# Patient Record
Sex: MALE | Race: WHITE | NOT HISPANIC OR LATINO | Employment: PART TIME | URBAN - METROPOLITAN AREA
[De-identification: names, ages, dates, MRNs, and addresses within clinical notes are randomized per-mention and may not be internally consistent; named-entity substitution may affect disease eponyms.]

---

## 2019-03-28 ENCOUNTER — APPOINTMENT (OUTPATIENT)
Dept: RADIOLOGY | Facility: IMAGING CENTER | Age: 44
End: 2019-03-28
Attending: PHYSICIAN ASSISTANT
Payer: COMMERCIAL

## 2019-03-28 ENCOUNTER — OFFICE VISIT (OUTPATIENT)
Dept: URGENT CARE | Facility: PHYSICIAN GROUP | Age: 44
End: 2019-03-28
Payer: COMMERCIAL

## 2019-03-28 VITALS
SYSTOLIC BLOOD PRESSURE: 124 MMHG | OXYGEN SATURATION: 97 % | HEIGHT: 78 IN | BODY MASS INDEX: 36.45 KG/M2 | HEART RATE: 74 BPM | WEIGHT: 315 LBS | RESPIRATION RATE: 18 BRPM | TEMPERATURE: 97.6 F | DIASTOLIC BLOOD PRESSURE: 82 MMHG

## 2019-03-28 DIAGNOSIS — S93.401A MODERATE RIGHT ANKLE SPRAIN, INITIAL ENCOUNTER: ICD-10-CM

## 2019-03-28 DIAGNOSIS — S93.601A RIGHT FOOT SPRAIN, INITIAL ENCOUNTER: ICD-10-CM

## 2019-03-28 DIAGNOSIS — S93.401A MODERATE RIGHT ANKLE SPRAIN, INITIAL ENCOUNTER: Primary | ICD-10-CM

## 2019-03-28 PROCEDURE — 73610 X-RAY EXAM OF ANKLE: CPT | Mod: 26,RT | Performed by: PHYSICIAN ASSISTANT

## 2019-03-28 PROCEDURE — 73630 X-RAY EXAM OF FOOT: CPT | Mod: TC,RT | Performed by: PHYSICIAN ASSISTANT

## 2019-03-28 PROCEDURE — 99213 OFFICE O/P EST LOW 20 MIN: CPT | Performed by: PHYSICIAN ASSISTANT

## 2019-03-28 RX ORDER — TRAMADOL HYDROCHLORIDE 50 MG/1
50-100 TABLET ORAL EVERY 4 HOURS PRN
Qty: 30 TAB | Refills: 0 | Status: SHIPPED | OUTPATIENT
Start: 2019-03-28 | End: 2019-03-28

## 2019-03-28 NOTE — LETTER
March 28, 2019       Patient: Jeffrey Cerda   YOB: 1975   Date of Visit: 3/28/2019         To Whom It May Concern:    It is my medical opinion that Jeffrey Cerda may be excused from work for the dates of 3/28/19-3/31/19.      If you have any questions or concerns, please don't hesitate to call [phone]          Sincerely,          Kali Alfonso P.A.-C.  Electronically Signed

## 2019-03-28 NOTE — LETTER
March 28, 2019       Patient: Jeffrey Cerda   YOB: 1975   Date of Visit: 3/28/2019         To Whom It May Concern:    It is my medical opinion that Jeffrey Cerda may be excused from work for the dates of 3/28/19-4/7/19.      If you have any questions or concerns, please don't hesitate to call [phone]          Sincerely,          Kali Alfonso P.A.-C.  Electronically Signed

## 2019-03-31 NOTE — PROGRESS NOTES
Subjective:      Pt is a 43 y.o. male who presents with Ankle Pain (R ankle pain, pt steped on a rock and twisted his ankle, sharp/shooting pain, x3 days)            HPI  This is a new problem. Pt notes 3 days ago stepped on a rock and rolled his right foot and ankle and now notes swelling, sharp and stabbing pain when he weight bears and is here as he is concerned for fracture. .Pt has not taken any Rx medications for this condition. Pt states the pain is a 7/10, aching in nature and worse during the day. Pt denies CP, SOB, NVD, paresthesias, headaches, dizziness, change in vision, hives, or other joint pain. The pt's medication list, problem list, and allergies have been evaluated and reviewed during today's visit.      PMH:  Negative per pt.      PSH:  Negative per pt.      Fam Hx:  the patient's family history is not pertinent to their current complaint    Soc HX:  Social History     Social History   • Marital status:      Spouse name: N/A   • Number of children: N/A   • Years of education: N/A     Occupational History   • Not on file.     Social History Main Topics   • Smoking status: Current Every Day Smoker     Packs/day: 0.50     Types: Cigarettes   • Smokeless tobacco: Never Used   • Alcohol use Yes      Comment: Occ   • Drug use: Yes     Types: Marijuana   • Sexual activity: Not on file     Other Topics Concern   • Not on file     Social History Narrative   • No narrative on file         Medications:    Current Outpatient Prescriptions:   •  lovastatin (MEVACOR) 20 MG Tab, Take 20 mg by mouth every evening., Disp: , Rfl:   •  gabapentin (NEURONTIN) 300 MG CAPS, Take 300 mg by mouth 3 times a day., Disp: , Rfl:   •  phentermine 37.5 MG capsule, Take 37.5 mg by mouth every morning., Disp: , Rfl:       Allergies:  Corticosteroids    ROS  Constitutional: Negative for fever, chills and malaise/fatigue.   HENT: Negative for congestion and sore throat.    Eyes: Negative for blurred vision, double vision  "and photophobia.   Respiratory: Negative for cough and shortness of breath.  Cardiovascular: Negative for chest pain and palpitations.   Gastrointestinal: Negative for heartburn, nausea, vomiting, abdominal pain, diarrhea and constipation.   Genitourinary: Negative for dysuria and flank pain.   Musculoskeletal: POS for right foot and ankle joint pain and myalgias.   Skin: Negative for itching and rash.   Neurological: Negative for dizziness, tingling and headaches.   Endo/Heme/Allergies: Does not bruise/bleed easily.   Psychiatric/Behavioral: Negative for depression. The patient is not nervous/anxious.           Objective:     /82 (BP Location: Left arm, Patient Position: Sitting, BP Cuff Size: Large adult)   Pulse 74   Temp 36.4 °C (97.6 °F) (Temporal)   Resp 18   Ht 1.981 m (6' 6\")   Wt (!) 162.4 kg (358 lb)   SpO2 97%   BMI 41.37 kg/m²      Physical Exam   Musculoskeletal:        Right ankle: He exhibits decreased range of motion and swelling. He exhibits no ecchymosis, no deformity, no laceration and normal pulse. Tenderness. Lateral malleolus and AITFL tenderness found. No medial malleolus, no CF ligament, no posterior TFL, no head of 5th metatarsal and no proximal fibula tenderness found. Achilles tendon normal.        Right foot: There is decreased range of motion, tenderness and swelling. There is no bony tenderness, normal capillary refill, no crepitus, no deformity and no laceration.        Feet:      Constitutional: PT is oriented to person, place, and time. PT appears well-developed and well-nourished. No distress.   HENT:   Head: Normocephalic and atraumatic.   Mouth/Throat: Oropharynx is clear and moist. No oropharyngeal exudate.   Eyes: Conjunctivae normal and EOM are normal. Pupils are equal, round, and reactive to light.   Neck: Normal range of motion. Neck supple. No thyromegaly present.   Cardiovascular: Normal rate, regular rhythm, normal heart sounds and intact distal pulses.  Exam " reveals no gallop and no friction rub.    No murmur heard.  Pulmonary/Chest: Effort normal and breath sounds normal. No respiratory distress. PT has no wheezes. PT has no rales. Pt exhibits no tenderness.   Abdominal: Soft. Bowel sounds are normal. PT exhibits no distension and no mass. There is no tenderness. There is no rebound and no guarding.   Neurological: PT is alert and oriented to person, place, and time. PT has normal reflexes. No cranial nerve deficit.   Skin: Skin is warm and dry. No rash noted. PT is not diaphoretic. No erythema.       Psychiatric: PT has a normal mood and affect. PT behavior is normal. Judgment and thought content normal.      RADS:  Narrative       3/28/2019 10:41 AM    HISTORY/REASON FOR EXAM:  Pain/Deformity Following Trauma  Right foot and ankle pain    TECHNIQUE/EXAM DESCRIPTION AND NUMBER OF VIEWS:  3 nonweightbearing views of the RIGHT foot.    COMPARISON:  None    FINDINGS:  No acute fracture or malalignment. There is soft tissue edema around the ankle joint in the dorsum of the foot.   Impression       No acute fracture is identified.   Reading Provider Reading Date   Nathalia Smith M.D. Mar 28, 2019   Signing Provider Signing Date Signing Time   Nathalia Smith M.D. Mar 28, 2019 11:20 AM       Narrative       3/28/2019 10:41 AM    HISTORY/REASON FOR EXAM:  Pain/Deformity Following Trauma  Right foot and ankle pain    TECHNIQUE/EXAM DESCRIPTION AND NUMBER OF VIEWS:  3 views of the RIGHT ankle.    COMPARISON: None    FINDINGS:    No acute fracture or malalignment.  There is diffuse soft tissue swelling around the ankle joint. No osteochondral lesion is identified.   Impression         No acute fracture identified.   Reading Provider Reading Date   Nathalia Smith M.D. Mar 28, 2019   Signing Provider Signing Date Signing Time   Nathalia Smith M.D. Mar 28, 2019 11:20 AM          Assessment/Plan:     1. Moderate right ankle sprain, initial encounter    - DX-ANKLE 3+  VIEWS RIGHT; Future    2. Right foot sprain, initial encounter    - DX-FOOT-COMPLETE 3+ RIGHT; Future    RICE therapy discussed  Gentle ROM exercises discussed  WBAT RLE  Ace wrap and walker boot to RLE  Pt has own crutches  Ice/heat therapy discussed  OTC ibuprofen for pain control  Rest, fluids encouraged.  AVS with medical info given.  Pt was in full understanding and agreement with the plan.  Follow-up as needed if symptoms worsen or fail to improve.

## 2020-06-11 ENCOUNTER — HOSPITAL ENCOUNTER (EMERGENCY)
Facility: MEDICAL CENTER | Age: 45
End: 2020-06-11
Attending: EMERGENCY MEDICINE
Payer: COMMERCIAL

## 2020-06-11 VITALS
RESPIRATION RATE: 16 BRPM | DIASTOLIC BLOOD PRESSURE: 75 MMHG | WEIGHT: 315 LBS | HEIGHT: 78 IN | BODY MASS INDEX: 36.45 KG/M2 | OXYGEN SATURATION: 97 % | TEMPERATURE: 97.7 F | HEART RATE: 70 BPM | SYSTOLIC BLOOD PRESSURE: 141 MMHG

## 2020-06-11 DIAGNOSIS — M25.562 ACUTE PAIN OF LEFT KNEE: ICD-10-CM

## 2020-06-11 PROCEDURE — 99282 EMERGENCY DEPT VISIT SF MDM: CPT

## 2020-06-11 RX ORDER — BACLOFEN 20 MG/1
20 TABLET ORAL 3 TIMES DAILY
COMMUNITY

## 2020-06-11 RX ORDER — OXYCODONE AND ACETAMINOPHEN 10; 325 MG/1; MG/1
1 TABLET ORAL EVERY 4 HOURS PRN
Status: SHIPPED | COMMUNITY
End: 2023-09-22

## 2020-06-11 NOTE — LETTER
[facility], EMERGENCY DEPT   [address], [place] [address]  Phone: Dept: [phone] - Fax:        [facility] Progress Report and Disability Certification  Date of Service: 6/11/2020   No Show:  No  Date / Time of Next Visit: 6/12/2020   Claim Information   Patient Name: Jeffrey Cerda  Claim Number:     Employer:  [employer] Date of Injury: 5/19/2020     Insurer / TPA: [employer] ID / SSN:    Occupation: [employer] 1 Diagnosis: The encounter diagnosis was Acute pain of left knee.    Medical Information   Related to Industrial Injury? Yes    Subjective Complaints:  Knee pain after work injury, history normal xray MRI   Objective Findings: Lateral and posterior lateral knee tenderness and lateral thigh tenderness   Pre-Existing Condition(s): none   Assessment:        Status: Additional Care RequiredDischarged / Care Transfer  Permanent Disability:No    Plan: Transfer Care    Diagnostics:      Comments:  Patient here requesting knee ultrasound.  [facility] said this a miscommunication and they will order the patient testing.  Patient discharged without intervention.    Disability Information   Status: Temporarily Totally Disabled    From:  6/11/2020  Through: 6/12/2020 Restrictions are:     Physical Restrictions   Sitting:    Standing:    Stooping:    Bending:      Squatting:    Walking:    Climbing:    Pushing:      Pulling:    Other:    Reaching Above Shoulder (L):   Reaching Above Shoulder (R):       Reaching Below Shoulder (L):    Reaching Below Shoulder (R):      Not to exceed Weight Limits   Carrying(hrs):   Weight Limit(lb):   Lifting(hrs):   Weight  Limit(lb):     Comments:      Repetitive Actions   Hands: i.e. Fine Manipulations from Grasping:     Feet: i.e. Operating Foot Controls:     Driving / Operate Machinery:     Physician Name: Tuan Cedillo Physician Signature: TUAN Bejarano M.D. e-Signature:  , Medical  Director   Clinic Name / Location: [facility], EMERGENCY DEPT  [address]  [place] [address]  [phone]     Clinic Phone Number: Dept: [phone]   Appointment Time:  Visit Start Time:    Check-In Time:  11:33 AM Visit Discharge Time:    Original-Treating Physician or Chiropractor    Page 2-Insurer/TPA    Page 3-Employer    Page 4-Employee

## 2020-06-11 NOTE — DISCHARGE INSTRUCTIONS
Occupational health will call you to give you exact instructions about what they want and when.  Follow-up with them.  Return if you ever develop chest pain, shortness of breath, redness and fever, generalized leg swelling.    You had a borderline or high normal blood pressure reading today.  This does not necessarily mean you have hypertension.  Please followup with your/a primary physician for comprehensive blood pressure evaluation and yearly fasting cholesterol assessment.  BP Readings from Last 3 Encounters:   06/11/20 148/86   03/28/19 124/82   01/29/16 110/80

## 2020-06-11 NOTE — ED TRIAGE NOTES
Pt amb to triage in mask using crutches.  Chief Complaint   Patient presents with   • Knee Pain     left knee, posterior   • Sent by MD     for US     Pt states he hurt his knee 2wks ago at work, had an MRI and xray. Pain persists, pt sent for further eval.

## 2020-06-11 NOTE — ED NOTES
Pt received discharge instructions and understood all including follow up, pt ambulated to lobby with cxrutches

## 2020-06-11 NOTE — LETTER
[facility], EMERGENCY DEPT   [address] [place], [place] [address]  Phone: Dept: [phone] - Fax:        [facility] Progress Report and Disability Certification  Date of Service: 6/11/2020   No Show:  No  Date / Time of Next Visit:     Claim Information   Patient Name: Jeffrey Cerda  Claim Number:     Employer:    Date of Injury: 5/19/2020     Insurer / TPA: [employer] ID / SSN: [id number]    Occupation:  Diagnosis: The encounter diagnosis was Acute pain of left knee.    Medical Information   Related to Industrial Injury?   ***   Subjective Complaints:      Objective Findings:     Pre-Existing Condition(s):     Assessment:        Status:    Permanent Disability:     Plan:      Diagnostics:      Comments:       Disability Information   Status:      From:     Through:   Restrictions are:     Physical Restrictions   Sitting:    Standing:    Stooping:    Bending:      Squatting:    Walking:    Climbing:    Pushing:      Pulling:    Other:    Reaching Above Shoulder (L):   Reaching Above Shoulder (R):       Reaching Below Shoulder (L):    Reaching Below Shoulder (R):      Not to exceed Weight Limits   Carrying(hrs):   Weight Limit(lb):   Lifting(hrs):   Weight  Limit(lb):     Comments:      Repetitive Actions   Hands: i.e. Fine Manipulations from Grasping:     Feet: i.e. Operating Foot Controls:     Driving / Operate Machinery:     Physician Name: Austen Cedillo Physician Signature:   e-Signature:  , Medical Director   Clinic Name / Location: [facility], EMERGENCY DEPT  [address] [place] [id number] [phone]     Clinic Phone Number: Dept: [phone]   Appointment Time:  Visit Start Time:    Check-In Time:  11:33 AM Visit Discharge Time:    Original-Treating Physician or Chiropractor    Page 2-Insurer/TPA    Page 3-Employer    Page 4-Employee

## 2020-06-11 NOTE — LETTER
[facility], EMERGENCY DEPT   [address] [place], [place] [address]  Phone: Dept: [phone] - Fax:        [facility] Progress Report and Disability Certification  Date of Service: 6/11/2020   No Show:  No  Date / Time of Next Visit: 6/12/2020   Claim Information   Patient Name: Jeffrey Cedra  Claim Number:     Employer:   [employer]  Date of Injury: 5/19/2020     Insurer / TPA: [employer] ID / SSN:    Occupation: [employer] 1 Diagnosis: The encounter diagnosis was Acute pain of left knee.    Medical Information   Related to Industrial Injury? Yes ***   Subjective Complaints:  Knee pain after work injury, history normal xray MRI   Objective Findings: Lateral and posterior lateral knee tenderness and lateral thigh tenderness   Pre-Existing Condition(s): none   Assessment:        Status: Additional Care RequiredDischarged / Care Transfer  Permanent Disability:No    Plan: Transfer Care    Diagnostics:      Comments:  Patient here requesting knee ultrasound.  [facility] said this a miscommunication and they will order the patient testing.  Patient discharged without intervention.    Disability Information   Status: Temporarily Totally Disabled    From:  6/11/2020  Through: 6/12/2020 Restrictions are:     Physical Restrictions   Sitting:    Standing:    Stooping:    Bending:      Squatting:    Walking:    Climbing:    Pushing:      Pulling:    Other:    Reaching Above Shoulder (L):   Reaching Above Shoulder (R):       Reaching Below Shoulder (L):    Reaching Below Shoulder (R):      Not to exceed Weight Limits   Carrying(hrs):   Weight Limit(lb):   Lifting(hrs):   Weight  Limit(lb):     Comments:      Repetitive Actions   Hands: i.e. Fine Manipulations from Grasping:     Feet: i.e. Operating Foot Controls:     Driving / Operate Machinery:     Physician Name: Tuan Cedillo Physician Signature: TUAN Bejarano M.D. e-Signature:  , Medical  Director   Clinic Name / Location: [facility], EMERGENCY DEPT  [address]  [place] [address]  [phone]     Clinic Phone Number: Dept: [phone]   Appointment Time:  Visit Start Time:    Check-In Time:  11:33 AM Visit Discharge Time:    Original-Treating Physician or Chiropractor    Page 2-Insurer/TPA    Page 3-Employer    Page 4-Employee

## 2020-06-11 NOTE — ED PROVIDER NOTES
"ED Provider Note    CHIEF COMPLAINT  Chief Complaint   Patient presents with   • Knee Pain     left knee, posterior   • Sent by MD     for US       HPI  Jeffrey Cerda is a 44 y.o. male who presents sent in by [facility] occupational health for an imaging study of the left leg and or knee.  The patient injured the knee around May 20 moving stoves at work.  He felt a pop in the posterior lateral aspect of the popliteal fossa.  He has been on crutches since.  Has had x-rays and MRI.  He saw Ortho yesterday who felt there was no acute abnormality on the x-rays or the MRI.  He is never had a DVT or PE.  He has pain over the lateral knee the lateral posterior knee and pain tracking up into the lateral left thigh.  Occasionally has some numbness and tingling on the dorsum of the foot.  No vascular disease.    REVIEW OF SYSTEMS  Pertinent positives include: Left knee pain thigh pain and foot numbness.  Pertinent negatives include: Fever, DVT or PE history.    PAST MEDICAL HISTORY  Obesity    SOCIAL HISTORY  Social History     Tobacco Use   • Smoking status: Current Every Day Smoker     Packs/day: 0.50     Types: Cigarettes   • Smokeless tobacco: Never Used   Substance Use Topics   • Alcohol use: Yes     Comment: Occ   • Drug use: Yes     Types: Marijuana         CURRENT MEDICATIONS  Home Medications     Reviewed by Xiomara Hanson R.N. (Registered Nurse) on 06/11/20 at 1142  Med List Status: Partial   Medication Last Dose Status   baclofen (LIORESAL) 20 MG tablet 6/11/2020 Active   gabapentin (NEURONTIN) 300 MG CAPS 6/11/2020 Active   lovastatin (MEVACOR) 20 MG Tab not taking Active   oxyCODONE-acetaminophen (PERCOCET-10)  MG Tab 6/11/2020 Active                ALLERGIES  Allergies   Allergen Reactions   • Corticosteroids        PHYSICAL EXAM  VITAL SIGNS: /86   Pulse 73   Temp 36.4 °C (97.6 °F) (Temporal)   Resp 16   Ht 1.981 m (6' 6\")   Wt (!) 193 kg (425 lb 7.8 oz)   SpO2 97%   BMI 49.17 kg/m² "   Constitutional :  Well developed, Well nourished, elevated blood pressure, afebrile.   HNT: Atraumatic.   Skin: Warm, dry, no erythema, no rash.  No lymphangitic streaking  Musculoskeletal: no limb deformities.  Over the lateral knee joint line in the lateral hamstring tendon.  No definite edema.  Tenderness of the lateral left thigh.  Full active extension preserved left knee.  No obvious effusion.  Flexion preserved to 100 degrees.      COURSE & MEDICAL DECISION MAKING  This patient presents for studies supposedly requested by [facility] regarding an acute left knee work-related injury.  Our  spent 30 minutes calling [facility] and they said a mistake made and the patient is not to come to the ER for testing.  They will order appropriate tests.  They would not tell us what test they had wanted.  We will contact the patient to clarify instructions.    Patient presents with work-related knee injury and likely has a distal hamstring, hamstring tendon or iliotibial band injury.  There is no evidence of DVT.    This patient has borderline or elevated blood pressure as recorded above and was instructed to followup with primary physician for comprehensive blood pressure evaluation and yearly fasting cholesterol assessment according to to CMS protocol.    PLAN:  D39 completed  Return for chest pain, shortness of breath, fever and redness    Follow-up your occupational health clinic    CONDITION:  Good.    FINAL IMPRESSION:  1. Acute pain of left knee          Electronically signed by: Austen Cedillo M.D., 6/11/2020

## 2020-07-10 ENCOUNTER — HOSPITAL ENCOUNTER (OUTPATIENT)
Dept: RADIOLOGY | Facility: MEDICAL CENTER | Age: 45
End: 2020-07-10
Attending: ORTHOPAEDIC SURGERY
Payer: COMMERCIAL

## 2020-07-10 DIAGNOSIS — S86.912A STRAIN OF LEFT KNEE AND LEG, INITIAL ENCOUNTER: ICD-10-CM

## 2020-07-10 PROCEDURE — 93971 EXTREMITY STUDY: CPT | Mod: LT

## 2020-10-13 ENCOUNTER — OFFICE VISIT (OUTPATIENT)
Dept: URGENT CARE | Facility: PHYSICIAN GROUP | Age: 45
End: 2020-10-13
Payer: COMMERCIAL

## 2020-10-13 ENCOUNTER — HOSPITAL ENCOUNTER (OUTPATIENT)
Facility: MEDICAL CENTER | Age: 45
End: 2020-10-13
Attending: PHYSICIAN ASSISTANT
Payer: COMMERCIAL

## 2020-10-13 VITALS
RESPIRATION RATE: 18 BRPM | WEIGHT: 315 LBS | DIASTOLIC BLOOD PRESSURE: 78 MMHG | SYSTOLIC BLOOD PRESSURE: 124 MMHG | HEIGHT: 78 IN | TEMPERATURE: 98.2 F | BODY MASS INDEX: 36.45 KG/M2 | HEART RATE: 82 BPM | OXYGEN SATURATION: 97 %

## 2020-10-13 DIAGNOSIS — R10.84 GENERALIZED ABDOMINAL PAIN: ICD-10-CM

## 2020-10-13 DIAGNOSIS — R11.0 NAUSEA: ICD-10-CM

## 2020-10-13 DIAGNOSIS — Z20.822 SUSPECTED COVID-19 VIRUS INFECTION: ICD-10-CM

## 2020-10-13 DIAGNOSIS — K43.9 VENTRAL HERNIA WITHOUT OBSTRUCTION OR GANGRENE: ICD-10-CM

## 2020-10-13 LAB
APPEARANCE UR: CLEAR
BILIRUB UR STRIP-MCNC: NORMAL MG/DL
COLOR UR AUTO: NORMAL
GLUCOSE UR STRIP.AUTO-MCNC: NEGATIVE MG/DL
KETONES UR STRIP.AUTO-MCNC: NEGATIVE MG/DL
LEUKOCYTE ESTERASE UR QL STRIP.AUTO: NEGATIVE
NITRITE UR QL STRIP.AUTO: NEGATIVE
PH UR STRIP.AUTO: 6 [PH] (ref 5–8)
PROT UR QL STRIP: NEGATIVE MG/DL
RBC UR QL AUTO: NEGATIVE
SP GR UR STRIP.AUTO: 1.02
UROBILINOGEN UR STRIP-MCNC: 8 MG/DL

## 2020-10-13 PROCEDURE — U0003 INFECTIOUS AGENT DETECTION BY NUCLEIC ACID (DNA OR RNA); SEVERE ACUTE RESPIRATORY SYNDROME CORONAVIRUS 2 (SARS-COV-2) (CORONAVIRUS DISEASE [COVID-19]), AMPLIFIED PROBE TECHNIQUE, MAKING USE OF HIGH THROUGHPUT TECHNOLOGIES AS DESCRIBED BY CMS-2020-01-R: HCPCS

## 2020-10-13 PROCEDURE — 99214 OFFICE O/P EST MOD 30 MIN: CPT | Mod: CS | Performed by: PHYSICIAN ASSISTANT

## 2020-10-13 PROCEDURE — 81002 URINALYSIS NONAUTO W/O SCOPE: CPT | Mod: CS | Performed by: PHYSICIAN ASSISTANT

## 2020-10-13 RX ORDER — ONDANSETRON 4 MG/1
4 TABLET, ORALLY DISINTEGRATING ORAL EVERY 6 HOURS PRN
Qty: 15 TAB | Refills: 0 | Status: SHIPPED | OUTPATIENT
Start: 2020-10-13 | End: 2023-09-22

## 2020-10-13 RX ORDER — ONDANSETRON 4 MG/1
4 TABLET, ORALLY DISINTEGRATING ORAL ONCE
Status: COMPLETED | OUTPATIENT
Start: 2020-10-13 | End: 2020-10-13

## 2020-10-13 RX ADMIN — ONDANSETRON 4 MG: 4 TABLET, ORALLY DISINTEGRATING ORAL at 19:37

## 2020-10-13 NOTE — LETTER
October 13, 2020         Patient: Jeffrey Cerda   YOB: 1975   Date of Visit: 10/13/2020           To Whom it May Concern:    Jeffrey Cerda was seen in my clinic on 10/13/2020. Please excuse him from court tomorrow, 10/14/2020, until COVID-19 testing is resulted. Patient may obtain copy of his results from his My Chart online portal.      If you have any questions or concerns, please don't hesitate to call.        Sincerely,           Pau Lynn P.A.-C.  Electronically Signed

## 2020-10-14 ENCOUNTER — TELEPHONE (OUTPATIENT)
Dept: URGENT CARE | Facility: PHYSICIAN GROUP | Age: 45
End: 2020-10-14

## 2020-10-14 DIAGNOSIS — Z20.822 SUSPECTED COVID-19 VIRUS INFECTION: ICD-10-CM

## 2020-10-14 LAB
COVID ORDER STATUS COVID19: NORMAL
SARS-COV-2 RNA RESP QL NAA+PROBE: DETECTED
SPECIMEN SOURCE: ABNORMAL

## 2020-10-14 NOTE — PROGRESS NOTES
"Subjective:      Jeffrey Cerda is a 44 y.o. male who presents with Cough (Nausea and vomitting and dark urine x 4 days.  )            HPI  44-year-old male presents to urgent care with new problem of worsening cough, nausea, subjective fevers, fatigue, body aches, abdominal pain, and head aches worsening since onset 4 days ago.  No chest pain.  No shortness of breath.  Denies known exposure to COVID-19 or sick contacts.  Patient reports history of reducible ventral hernia increasing abdominal pain secondary to cough.  Reports associated nausea and no vomiting or diarrhea.   Denies other associated aggravating or alleviating factors.       Review of Systems   Constitutional: Positive for chills, fever and malaise/fatigue.   HENT: Positive for congestion. Negative for ear pain, sinus pain and sore throat.    Eyes: Negative for pain, discharge and redness.   Respiratory: Positive for cough, sputum production and shortness of breath. Negative for wheezing.    Cardiovascular: Negative for chest pain and palpitations.   Gastrointestinal: Positive for abdominal pain and nausea. Negative for blood in stool, constipation, diarrhea and vomiting.   Genitourinary: Negative for dysuria, flank pain and hematuria.   Musculoskeletal: Positive for myalgias. Negative for neck pain.   Skin: Negative for rash.   Neurological: Positive for headaches. Negative for dizziness.   Endo/Heme/Allergies: Negative for environmental allergies.   All other systems reviewed and are negative.      No past medical history on file.  Medications and allergies reviewed in epic.  Social History     Tobacco Use   • Smoking status: Current Every Day Smoker     Packs/day: 0.50     Types: Cigarettes   • Smokeless tobacco: Never Used   Substance Use Topics   • Alcohol use: Yes     Comment: Occ      Objective:     /78   Pulse 82   Temp 36.8 °C (98.2 °F)   Resp 18   Ht 1.981 m (6' 6\")   Wt (!) 192.8 kg (425 lb)   SpO2 97%   BMI 49.11 " kg/m²      Physical Exam  Vitals signs reviewed.   Constitutional:       General: He is in acute distress.      Appearance: Normal appearance. He is well-developed. He is obese. He is ill-appearing and diaphoretic.      Comments: Mild distress secondary to pain   HENT:      Head: Normocephalic and atraumatic.      Mouth/Throat:      Mouth: Mucous membranes are dry.      Pharynx: Oropharynx is clear.   Eyes:      General: No scleral icterus.     Conjunctiva/sclera: Conjunctivae normal.   Neck:      Musculoskeletal: Normal range of motion and neck supple.   Cardiovascular:      Rate and Rhythm: Normal rate and regular rhythm.      Heart sounds: Normal heart sounds.   Pulmonary:      Effort: Pulmonary effort is normal. No respiratory distress.      Breath sounds: Normal breath sounds. No wheezing, rhonchi or rales.   Abdominal:      General: Bowel sounds are normal. There is no distension.      Palpations: Abdomen is soft.      Hernia: A hernia is present. Hernia is present in the ventral area.          Comments: Right-sided reducible ventral hernia with tenderness to palpation.   Musculoskeletal: Normal range of motion.   Lymphadenopathy:      Cervical: No cervical adenopathy.   Skin:     General: Skin is warm.      Coloration: Skin is not jaundiced or pale.   Neurological:      General: No focal deficit present.      Mental Status: He is alert and oriented to person, place, and time.   Psychiatric:         Mood and Affect: Mood normal.         Behavior: Behavior normal.         Thought Content: Thought content normal.         Judgment: Judgment normal.                 Assessment/Plan:     1. Suspected COVID-19 virus infection  COVID/SARS COV-2 PCR   2. Generalized abdominal pain  POCT Urinalysis   3. Nausea  ondansetron (ZOFRAN ODT) dispertab 4 mg    ondansetron (ZOFRAN ODT) 4 MG TABLET DISPERSIBLE   4. Ventral hernia without obstruction or gangrene        Ref Range & Units 9d ago   POC Color Negative Rosalind    POC  Appearance Negative Clear    POC Leukocyte Esterase Negative Negative    POC Nitrites Negative Negative    POC Urobiligen Negative (0.2) mg/dL 8.0    POC Protein Negative mg/dL Negative    POC Urine PH 5.0 - 8.0 6.0    POC Blood Negative Negative    POC Specific Gravity <1.005 - >1.030 1.025    POC Ketones Negative mg/dL Negative    POC Bilirubin Negative mg/dL Small    POC Glucose Negative mg/dL Negative      Patient instructed to self-isolate/quarantine per CDC guidelines.  I will follow-up pending COVID-19 testing. Discussed with patient may obtain hard copy of results on [facility].   Advised patient symptoms are most likely viral in etiology. Increased fluids and rest. Discussed use of OTC cough and cold medication and Tylenol/Motrin for symptomatic relief.  Return for reevaluation or proceed to ED if symptoms persist or worsen. Supportive care, differential diagnoses, and indications for immediate follow-up discussed with patient. Patient should to proceed to ED for development of symptoms including but not limited to shortness of breath breath, difficulty breathing, or worsening symptoms not manageable at home or worsening symptoms of abdominal pain, nausea, vomiting, diarrhea, or fevers.   The patient demonstrated a good understanding and agreed with the treatment plan and has no further questions regarding care.   Vital signs stable, patient in no acute respiratory distress.  Discussed with patient at length importance of communal effort to help decrease the infection rate of COVID 19. Patient advised to avoid large gatherings of people and practice good hand hygiene and respiratory precautions. COVID-19 discharge instructions and CDC guidelines provided to patient in AVS.      This patient is evaluated under [facility] isolation protocols in urgent care.  Out of an abundance of caution I am wearing a N95 mask, protective eye gear, gloves and gown through all interaction with patient.

## 2020-10-14 NOTE — PATIENT INSTRUCTIONS
Ventral Hernia    A ventral hernia is a bulge of tissue from inside the abdomen that pushes through a weak area of the muscles that form the front wall of the abdomen. The tissues inside the abdomen are inside a sac (peritoneum). These tissues include the small intestine, large intestine, and the fatty tissue that covers the intestines (omentum). Sometimes, the bulge that forms a hernia contains intestines. Other hernias contain only fat. Ventral hernias do not go away without surgical treatment.  There are several types of ventral hernias. You may have:  · A hernia at an incision site from previous abdominal surgery (incisional hernia).  · A hernia just above the belly button (epigastric hernia), or at the belly button (umbilical hernia). These types of hernias can develop from heavy lifting or straining.  · A hernia that comes and goes (reducible hernia). It may be visible only when you lift or strain. This type of hernia can be pushed back into the abdomen (reduced).  · A hernia that traps abdominal tissue inside the hernia (incarcerated hernia). This type of hernia does not reduce.  · A hernia that cuts off blood flow to the tissues inside the hernia (strangulated hernia). The tissues can start to die if this happens. This is a very painful bulge that cannot be reduced. A strangulated hernia is a medical emergency.  What are the causes?  This condition is caused by abdominal tissue putting pressure on an area of weakness in the abdominal muscles.  What increases the risk?  The following factors may make you more likely to develop this condition:  · Being male.  · Being 60 or older.  · Being overweight or obese.  · Having had previous abdominal surgery, especially if there was an infection after surgery.  · Having had an injury to the abdominal wall.  · Having had several pregnancies.  · Having a buildup of fluid inside the abdomen (ascites).  What are the signs or symptoms?  The only symptom of a ventral hernia  may be a painless bulge in the abdomen. A reducible hernia may be visible only when you strain, cough, or lift. Other symptoms may include:  · Dull pain.  · A feeling of pressure.  Signs and symptoms of a strangulated hernia may include:  · Increasing pain.  · Nausea and vomiting.  · Pain when pressing on the hernia.  · The skin over the hernia turning red or purple.  · Constipation.  · Blood in the stool (feces).  How is this diagnosed?  This condition may be diagnosed based on:  · Your symptoms.  · Your medical history.  · A physical exam. You may be asked to cough or strain while standing. These actions increase the pressure inside your abdomen and force the hernia through the opening in your muscles. Your health care provider may try to reduce the hernia by pressing on it.  · Imaging studies, such as an ultrasound or CT scan.  How is this treated?  This condition is treated with surgery. If you have a strangulated hernia, surgery is done as soon as possible. If your hernia is small and not incarcerated, you may be asked to lose some weight before surgery.  Follow these instructions at home:  · Follow instructions from your health care provider about eating or drinking restrictions.  · If you are overweight, your health care provider may recommend that you increase your activity level and eat a healthier diet.  · Do not lift anything that is heavier than 10 lb (4.5 kg).  · Return to your normal activities as told by your health care provider. Ask your health care provider what activities are safe for you. You may need to avoid activities that increase pressure on your hernia.  · Take over-the-counter and prescription medicines only as told by your health care provider.  · Keep all follow-up visits as told by your health care provider. This is important.  Contact a health care provider if:  · Your hernia gets larger.  · Your hernia becomes painful.  Get help right away if:  · Your hernia becomes increasingly  painful.  · You have pain along with any of the following:  ? Changes in skin color in the area of the hernia.  ? Nausea.  ? Vomiting.  ? Fever.  Summary  · A ventral hernia is a bulge of tissue from inside the abdomen that pushes through a weak area of the muscles that form the front wall of the abdomen.  · This condition is treated with surgery, which may be urgent depending on your hernia.  · Do not lift anything that is heavier than 10 lb (4.5 kg), and follow activity instructions from your health care provider.  This information is not intended to replace advice given to you by your health care provider. Make sure you discuss any questions you have with your health care provider.  Document Released: 12/04/2013 Document Revised: 01/30/2019 Document Reviewed: 07/09/2018  [employer] Patient Education © 2020 [employer]  INSTRUCTIONS FOR COVID-19 OR ANY OTHER INFECTIOUS RESPIRATORY ILLNESSES    The Centers for Disease Control and Prevention (CDC) states that early indications for COVID-19 include cough, shortness of breath, difficulty breathing, or at least two of the following symptoms: chills, shaking with chills, muscle pain, headache, sore throat, and loss of taste or smell. Symptoms can range from mild to severe and may appear up to two weeks after exposure to the virus.    The practice of self-isolation and quarantine helps protect the public and your family by  preventing exposure to people who have or may have a contagious disease. Please follow the prevention steps below as based on CDC guidelines:    WHEN TO STOP ISOLATION: Persons with COVID-19 or any other infectious respiratory illness who have symptoms and were advised to care for themselves at home may discontinue home isolation under the following conditions:  · At least 24 hours have passed since recovery defined as resolution of fever without the use of fever-reducing medications; AND,  · Improvement in respiratory symptoms (e.g., cough, shortness  of breath); AND,  · At least 10 days have passed since symptoms first appeared and have had no subsequent illness.    MONITOR YOUR SYMPTOMS: If your illness is worsening, seek prompt medical attention. If you have a medical emergency and need to call 911, notify the dispatch personnel that you have, or are being evaluated for confirmed or suspected COVID-19 or another infectious respiratory illness. Wear a facemask if possible.    ACTIVITY RESTRICTION: restrict activities outside your home, except for getting medical care. Do not go to work, school, or public areas. Avoid using public transportation, ride-sharing, or taxis.    SCHEDULED MEDICAL APPOINTMENTS: Notify your provider that you have, or are being evaluated for, confirmed or suspected COVID-19 or another infectious respiratory. This will help the healthcare provider’s office safely take care of you and keep other people from getting exposed or infected.    FACEMASKS, when to wear: Anytime you are away from your home or around other people or pets. If you are unable to wear one, maintain a minimum of 6 feet distancing from others.    LIVING ENVIRONMENT: Stay in a separate room from other people and pets. If possible, use a separate bathroom, have someone else care for your pets and avoid sharing household items. Any items used should be washed thoroughly with soap and water. Clean all “high-touch” surfaces every day. Use a household cleaning spray or wipe, according to the label instructions. High touch surfaces include (but are not limited to) counters, tabletops, doorknobs, bathroom fixtures, toilets, phones, keyboards, tablets, and bedside tables.     HAND WASHING: Frequently wash hands with soap and water for at least 20 seconds,  especially after blowing your nose, coughing, or sneezing; going to the bathroom; before and after interacting with pets; and before and after eating or preparing food. If hands are visibly dirty use soap and water. If soap and  water are not available, use an alcohol-based hand  with at least 60% alcohol. Avoid touching your eyes, nose, and mouth with unwashed hands. Cover your coughs and sneezes with a tissue. Throw used tissues in a lined trash can. Immediately wash your hands.    ACTIVE/FACILITATED SELF-MONITORING: Follow instructions provided by your local health department or health professionals, as appropriate. When working with your local health department check their available hours.    [facility]   Phone Number   [place] [phone]   [place], [place] [phone]   [place] [address]   [place] [phone]     IF YOU HAVE CONFIRMED POSITIVE COVID-19:    Those who have completely recovered from COVID-19 may have immune-boosting antibodies in their plasma--called “convalescent plasma”--that could be used to treat critically ill COVID19 patients.    [facility] is excited to begin working with [facility] on collecting convalescent plasma from  people who have recovered from COVID-19 as part of a program to treat patients infected with the virus. This FDA-approved “emergency investigational new drug” is a special blood product containing antibodies that may give patients an extra boost to fight the virus.    To be eligible to donate convalescent plasma, you must have a prior COVID-19 diagnosis documented by a laboratory test (or a positive test result for SARS-CoV-2 antibodies) and meet additional eligibility requirements.    If you are interested in donating convalescent plasma or have any additional questions, please contact the [facility] Convalescent Plasma  at [phone] or via e-mail at [email or web address].

## 2020-10-16 ENCOUNTER — TELEPHONE (OUTPATIENT)
Dept: URGENT CARE | Facility: PHYSICIAN GROUP | Age: 45
End: 2020-10-16

## 2020-10-22 ASSESSMENT — ENCOUNTER SYMPTOMS
DIARRHEA: 0
EYE DISCHARGE: 0
COUGH: 1
PALPITATIONS: 0
CHILLS: 1
FLANK PAIN: 0
EYE PAIN: 0
MYALGIAS: 1
SPUTUM PRODUCTION: 1
FEVER: 1
NECK PAIN: 0
HEADACHES: 1
CONSTIPATION: 0
DIZZINESS: 0
SORE THROAT: 0
EYE REDNESS: 0
SINUS PAIN: 0
WHEEZING: 0
ABDOMINAL PAIN: 1
VOMITING: 0
BLOOD IN STOOL: 0
SHORTNESS OF BREATH: 1
NAUSEA: 1

## 2021-03-23 ENCOUNTER — HOSPITAL ENCOUNTER (EMERGENCY)
Facility: MEDICAL CENTER | Age: 46
End: 2021-03-23
Attending: EMERGENCY MEDICINE
Payer: COMMERCIAL

## 2021-03-23 ENCOUNTER — OFFICE VISIT (OUTPATIENT)
Dept: URGENT CARE | Facility: PHYSICIAN GROUP | Age: 46
End: 2021-03-23
Payer: COMMERCIAL

## 2021-03-23 VITALS
WEIGHT: 315 LBS | TEMPERATURE: 98.9 F | HEART RATE: 70 BPM | DIASTOLIC BLOOD PRESSURE: 77 MMHG | RESPIRATION RATE: 18 BRPM | BODY MASS INDEX: 36.45 KG/M2 | SYSTOLIC BLOOD PRESSURE: 121 MMHG | OXYGEN SATURATION: 94 % | HEIGHT: 78 IN

## 2021-03-23 VITALS
OXYGEN SATURATION: 95 % | HEIGHT: 78 IN | BODY MASS INDEX: 36.45 KG/M2 | SYSTOLIC BLOOD PRESSURE: 122 MMHG | HEART RATE: 92 BPM | WEIGHT: 315 LBS | DIASTOLIC BLOOD PRESSURE: 72 MMHG | TEMPERATURE: 97 F

## 2021-03-23 DIAGNOSIS — R19.7 DIARRHEA OF PRESUMED INFECTIOUS ORIGIN: ICD-10-CM

## 2021-03-23 DIAGNOSIS — K42.0 UMBILICAL HERNIA, INCARCERATED: ICD-10-CM

## 2021-03-23 DIAGNOSIS — K43.6 IRREDUCIBLE VENTRAL HERNIA: ICD-10-CM

## 2021-03-23 DIAGNOSIS — R68.83 CHILLS: ICD-10-CM

## 2021-03-23 LAB
ALBUMIN SERPL BCP-MCNC: 3.7 G/DL (ref 3.2–4.9)
ALBUMIN/GLOB SERPL: 1.2 G/DL
ALP SERPL-CCNC: 83 U/L (ref 30–99)
ALT SERPL-CCNC: 24 U/L (ref 2–50)
ANION GAP SERPL CALC-SCNC: 11 MMOL/L (ref 7–16)
AST SERPL-CCNC: 16 U/L (ref 12–45)
BASOPHILS # BLD AUTO: 0.5 % (ref 0–1.8)
BASOPHILS # BLD: 0.08 K/UL (ref 0–0.12)
BILIRUB SERPL-MCNC: 1.2 MG/DL (ref 0.1–1.5)
BUN SERPL-MCNC: 10 MG/DL (ref 8–22)
CALCIUM SERPL-MCNC: 8.9 MG/DL (ref 8.5–10.5)
CHLORIDE SERPL-SCNC: 102 MMOL/L (ref 96–112)
CO2 SERPL-SCNC: 25 MMOL/L (ref 20–33)
CREAT SERPL-MCNC: 0.8 MG/DL (ref 0.5–1.4)
EOSINOPHIL # BLD AUTO: 0.41 K/UL (ref 0–0.51)
EOSINOPHIL NFR BLD: 2.5 % (ref 0–6.9)
ERYTHROCYTE [DISTWIDTH] IN BLOOD BY AUTOMATED COUNT: 47.7 FL (ref 35.9–50)
GLOBULIN SER CALC-MCNC: 3.2 G/DL (ref 1.9–3.5)
GLUCOSE SERPL-MCNC: 113 MG/DL (ref 65–99)
HCT VFR BLD AUTO: 46.5 % (ref 42–52)
HGB BLD-MCNC: 16 G/DL (ref 14–18)
IMM GRANULOCYTES # BLD AUTO: 0.06 K/UL (ref 0–0.11)
IMM GRANULOCYTES NFR BLD AUTO: 0.4 % (ref 0–0.9)
LACTATE BLD-SCNC: 1.7 MMOL/L (ref 0.5–2)
LIPASE SERPL-CCNC: 11 U/L (ref 11–82)
LYMPHOCYTES # BLD AUTO: 1.3 K/UL (ref 1–4.8)
LYMPHOCYTES NFR BLD: 7.8 % (ref 22–41)
MCH RBC QN AUTO: 34.5 PG (ref 27–33)
MCHC RBC AUTO-ENTMCNC: 34.4 G/DL (ref 33.7–35.3)
MCV RBC AUTO: 100.2 FL (ref 81.4–97.8)
MONOCYTES # BLD AUTO: 1.41 K/UL (ref 0–0.85)
MONOCYTES NFR BLD AUTO: 8.5 % (ref 0–13.4)
NEUTROPHILS # BLD AUTO: 13.31 K/UL (ref 1.82–7.42)
NEUTROPHILS NFR BLD: 80.3 % (ref 44–72)
NRBC # BLD AUTO: 0 K/UL
NRBC BLD-RTO: 0 /100 WBC
PLATELET # BLD AUTO: 224 K/UL (ref 164–446)
PMV BLD AUTO: 10.6 FL (ref 9–12.9)
POTASSIUM SERPL-SCNC: 3.5 MMOL/L (ref 3.6–5.5)
PROT SERPL-MCNC: 6.9 G/DL (ref 6–8.2)
RBC # BLD AUTO: 4.64 M/UL (ref 4.7–6.1)
SODIUM SERPL-SCNC: 138 MMOL/L (ref 135–145)
WBC # BLD AUTO: 16.6 K/UL (ref 4.8–10.8)

## 2021-03-23 PROCEDURE — 99284 EMERGENCY DEPT VISIT MOD MDM: CPT

## 2021-03-23 PROCEDURE — 700111 HCHG RX REV CODE 636 W/ 250 OVERRIDE (IP): Performed by: EMERGENCY MEDICINE

## 2021-03-23 PROCEDURE — 83605 ASSAY OF LACTIC ACID: CPT

## 2021-03-23 PROCEDURE — 99215 OFFICE O/P EST HI 40 MIN: CPT | Performed by: NURSE PRACTITIONER

## 2021-03-23 PROCEDURE — 83690 ASSAY OF LIPASE: CPT

## 2021-03-23 PROCEDURE — 96374 THER/PROPH/DIAG INJ IV PUSH: CPT

## 2021-03-23 PROCEDURE — 80053 COMPREHEN METABOLIC PANEL: CPT

## 2021-03-23 PROCEDURE — 85025 COMPLETE CBC W/AUTO DIFF WBC: CPT

## 2021-03-23 RX ORDER — HYDROMORPHONE HYDROCHLORIDE 1 MG/ML
1 INJECTION, SOLUTION INTRAMUSCULAR; INTRAVENOUS; SUBCUTANEOUS ONCE
Status: COMPLETED | OUTPATIENT
Start: 2021-03-23 | End: 2021-03-23

## 2021-03-23 RX ADMIN — HYDROMORPHONE HYDROCHLORIDE 1 MG: 1 INJECTION, SOLUTION INTRAMUSCULAR; INTRAVENOUS; SUBCUTANEOUS at 13:28

## 2021-03-23 ASSESSMENT — ENCOUNTER SYMPTOMS
EYE PAIN: 0
WEAKNESS: 0
CONSTIPATION: 0
DIZZINESS: 0
VOMITING: 0
CHILLS: 1
MYALGIAS: 0
NAUSEA: 0
SHORTNESS OF BREATH: 0
ABDOMINAL PAIN: 1
SWEATS: 1
DIARRHEA: 1
NERVOUS/ANXIOUS: 0
SORE THROAT: 0
FEVER: 0
BLOOD IN STOOL: 0
ORTHOPNEA: 0
COUGH: 0
HEADACHES: 0

## 2021-03-23 NOTE — ED PROVIDER NOTES
"ED Provider Note    CHIEF COMPLAINT  Chief Complaint   Patient presents with    Hernia     umbilical hernia, hx of same, reports it's been protruding for 2 days and isn't able to push it back in.     Diarrhea     x2 days.        HPI  Jeffrey Cerda is a 45 y.o. male who presents with severe periumbilical abdominal pain for the past 2 days, states that his known hernia is not able to be reduced over the past 2 days.  Has had diarrhea he states he thinks there is been some blood in it.  He has had this hernia for years, has never had it become incarcerated.  No chest pain or shortness of breath.  No vomiting but has been nauseated.  No fever.  History is otherwise significant for chronic pain for which she takes prescribed opiates and Neurontin, hypercholesterolemia and morbid obesity.    REVIEW OF SYSTEMS  Negative for fever, rash, chest pain, dyspnea, headache, back pain. All other systems are negative.     PAST MEDICAL HISTORY  Hypercholesterolemia  Chronic pain  Morbid obesity    FAMILY HISTORY  History reviewed. No pertinent family history.    SOCIAL HISTORY  Social History     Tobacco Use    Smoking status: Current Some Day Smoker     Packs/day: 0.50     Types: Cigarettes    Smokeless tobacco: Never Used   Substance Use Topics    Alcohol use: Yes     Comment: Occ    Drug use: Yes     Types: Marijuana, Inhaled     Comment: thc       SURGICAL HISTORY  History reviewed. No pertinent surgical history.    CURRENT MEDICATIONS  I personally reviewed the medication list in the charting documentation.     ALLERGIES  Allergies   Allergen Reactions    Corticosteroids        MEDICAL RECORD  I have reviewed patient's medical record and pertinent results are listed above.      PHYSICAL EXAM  VITAL SIGNS: /90   Pulse 93   Temp 37.2 °C (98.9 °F) (Oral)   Resp 18   Ht 1.981 m (6' 6\")   Wt (!) 190 kg (419 lb 8.6 oz)   SpO2 92%   BMI 48.48 kg/m²    Constitutional: Appears to be quite uncomfortable secondary " to his abdominal pain  HENT: Normocephalic, no obvious evidence of acute trauma.  Eyes: No scleral icterus. Normal conjunctiva   Neck: Comfortable movement without any obvious restriction in the range of motion.  Cardiovascular: Upon ascultation I appreciate a regular heart rhythm and a normal rate.   Thorax & Lungs: Normal nonlabored respirations. Upon ascultation, there is no obvious chest wall tenderness. I appreciate no wheezing, rhonchi or rales. There is normal air movement.    Abdomen: Morbidly obese abdomen, difficult to assess for distention, has minimal erythema involving the periumbilical region with an obvious hernia that is tender to palpation and not reducible at the bedside.  The upper abdomen is nontender.  Skin: The exposed portions of skin reveal no obvious rash or other abnormalities.  Extremities/Musculoskeletal: No obvious sign of acute trauma. No asymmetric calf tenderness or edema.   Neurologic: Alert & oriented. No focal deficits observed.   Psychiatric: Normal affect appropriate for the clinical situation.    DIAGNOSTIC STUDIES / PROCEDURES    LABS/EKGs  Results for orders placed or performed during the hospital encounter of 03/23/21   CBC WITH DIFFERENTIAL   Result Value Ref Range    WBC 16.6 (H) 4.8 - 10.8 K/uL    RBC 4.64 (L) 4.70 - 6.10 M/uL    Hemoglobin 16.0 14.0 - 18.0 g/dL    Hematocrit 46.5 42.0 - 52.0 %    .2 (H) 81.4 - 97.8 fL    MCH 34.5 (H) 27.0 - 33.0 pg    MCHC 34.4 33.7 - 35.3 g/dL    RDW 47.7 35.9 - 50.0 fL    Platelet Count 224 164 - 446 K/uL    MPV 10.6 9.0 - 12.9 fL    Neutrophils-Polys 80.30 (H) 44.00 - 72.00 %    Lymphocytes 7.80 (L) 22.00 - 41.00 %    Monocytes 8.50 0.00 - 13.40 %    Eosinophils 2.50 0.00 - 6.90 %    Basophils 0.50 0.00 - 1.80 %    Immature Granulocytes 0.40 0.00 - 0.90 %    Nucleated RBC 0.00 /100 WBC    Neutrophils (Absolute) 13.31 (H) 1.82 - 7.42 K/uL    Lymphs (Absolute) 1.30 1.00 - 4.80 K/uL    Monos (Absolute) 1.41 (H) 0.00 - 0.85 K/uL     Eos (Absolute) 0.41 0.00 - 0.51 K/uL    Baso (Absolute) 0.08 0.00 - 0.12 K/uL    Immature Granulocytes (abs) 0.06 0.00 - 0.11 K/uL    NRBC (Absolute) 0.00 K/uL   COMP METABOLIC PANEL   Result Value Ref Range    Sodium 138 135 - 145 mmol/L    Potassium 3.5 (L) 3.6 - 5.5 mmol/L    Chloride 102 96 - 112 mmol/L    Co2 25 20 - 33 mmol/L    Anion Gap 11.0 7.0 - 16.0    Glucose 113 (H) 65 - 99 mg/dL    Bun 10 8 - 22 mg/dL    Creatinine 0.80 0.50 - 1.40 mg/dL    Calcium 8.9 8.5 - 10.5 mg/dL    AST(SGOT) 16 12 - 45 U/L    ALT(SGPT) 24 2 - 50 U/L    Alkaline Phosphatase 83 30 - 99 U/L    Total Bilirubin 1.2 0.1 - 1.5 mg/dL    Albumin 3.7 3.2 - 4.9 g/dL    Total Protein 6.9 6.0 - 8.2 g/dL    Globulin 3.2 1.9 - 3.5 g/dL    A-G Ratio 1.2 g/dL   LIPASE   Result Value Ref Range    Lipase 11 11 - 82 U/L   LACTIC ACID   Result Value Ref Range    Lactic Acid 1.7 0.5 - 2.0 mmol/L   ESTIMATED GFR   Result Value Ref Range    GFR If African American >60 >60 mL/min/1.73 m 2    GFR If Non African American >60 >60 mL/min/1.73 m 2        COURSE & MEDICAL DECISION MAKING  I have reviewed any medical record information, laboratory studies and radiographic results as noted above.  Differential diagnoses includes: Incarcerated hernia, bowel ischemia, anemia, dehydration, electrolyte abnormalities    Encounter Summary: This is a very pleasant 45 y.o. male who unfortunately required evaluation in the emergency department today with 2 days of periumbilical abdominal pain and an apparently incarcerated hernia, tender on palpation and not reducible at the initial bedside examination.  His presentation is complicated by morbid obesity.  Appears quite uncomfortable but not acutely ill, vital signs are reassuring.  Will administer Dilaudid and reattempt the reduction.  Blood work will be obtained.  In the event of an unsuccessful reduction I will obtain a CT scan given his habitus (BMI 48.5) ------- mild leukocytosis identified on the blood work, after  the administration of analgesics successful and frankly easy reduction of the umbilical hernia was performed at the bedside.  Pain improved significantly.  At this point, I think the patient is stable and appropriate for discharge although I did go over very strict return instructions and he expressed understanding.  I will refer him to general surgery, have instructed him to follow-up as soon as possible for further evaluation and care      DISPOSITION: Discharged home in stable condition      FINAL IMPRESSION  1. Umbilical hernia, incarcerated           This dictation was created using voice recognition software. The accuracy of the dictation is limited to the abilities of the software. I expect there may be some errors of grammar and possibly content. The nursing notes were reviewed and certain aspects of this information were incorporated into this note.    Electronically signed by: Yahir Farmer M.D., 3/23/2021 1:21 PM

## 2021-03-23 NOTE — DISCHARGE INSTRUCTIONS
Return immediately to the emergency department if you have return of your pain, if your hernia is again stuck, if you have any ongoing blood in your stool, fever or any other concerns.

## 2021-03-23 NOTE — ED TRIAGE NOTES
"Chief Complaint   Patient presents with   • Hernia     umbilical hernia, hx of same, reports it's been protruding for 2 days and isn't able to push it back in.    • Diarrhea     x2 days.      Pt ambulatory to triage for above. NAD noted. VSS.     /90   Pulse 93   Temp 37.2 °C (98.9 °F) (Oral)   Resp 18   Ht 1.981 m (6' 6\")   Wt (!) 190 kg (419 lb 8.6 oz)   SpO2 92%   BMI 48.48 kg/m²     "

## 2021-03-25 ENCOUNTER — HOSPITAL ENCOUNTER (EMERGENCY)
Facility: MEDICAL CENTER | Age: 46
End: 2021-03-25
Attending: EMERGENCY MEDICINE
Payer: COMMERCIAL

## 2021-03-25 ENCOUNTER — APPOINTMENT (OUTPATIENT)
Dept: RADIOLOGY | Facility: MEDICAL CENTER | Age: 46
End: 2021-03-25
Attending: EMERGENCY MEDICINE
Payer: COMMERCIAL

## 2021-03-25 VITALS
HEART RATE: 75 BPM | HEIGHT: 78 IN | TEMPERATURE: 97.3 F | DIASTOLIC BLOOD PRESSURE: 56 MMHG | BODY MASS INDEX: 36.45 KG/M2 | WEIGHT: 315 LBS | RESPIRATION RATE: 18 BRPM | OXYGEN SATURATION: 99 % | SYSTOLIC BLOOD PRESSURE: 103 MMHG

## 2021-03-25 DIAGNOSIS — K52.9 COLITIS: ICD-10-CM

## 2021-03-25 DIAGNOSIS — K42.9 UMBILICAL HERNIA WITHOUT OBSTRUCTION AND WITHOUT GANGRENE: ICD-10-CM

## 2021-03-25 LAB
ALBUMIN SERPL BCP-MCNC: 3.2 G/DL (ref 3.2–4.9)
ALBUMIN/GLOB SERPL: 1.1 G/DL
ALP SERPL-CCNC: 87 U/L (ref 30–99)
ALT SERPL-CCNC: 44 U/L (ref 2–50)
ANION GAP SERPL CALC-SCNC: 8 MMOL/L (ref 7–16)
AST SERPL-CCNC: 31 U/L (ref 12–45)
BASOPHILS # BLD AUTO: 0.7 % (ref 0–1.8)
BASOPHILS # BLD: 0.06 K/UL (ref 0–0.12)
BILIRUB SERPL-MCNC: 0.4 MG/DL (ref 0.1–1.5)
BLOOD CULTURE HOLD CXBCH: NORMAL
BUN SERPL-MCNC: 10 MG/DL (ref 8–22)
CALCIUM SERPL-MCNC: 8.4 MG/DL (ref 8.5–10.5)
CHLORIDE SERPL-SCNC: 101 MMOL/L (ref 96–112)
CO2 SERPL-SCNC: 26 MMOL/L (ref 20–33)
CREAT SERPL-MCNC: 0.8 MG/DL (ref 0.5–1.4)
EOSINOPHIL # BLD AUTO: 0.58 K/UL (ref 0–0.51)
EOSINOPHIL NFR BLD: 6.5 % (ref 0–6.9)
ERYTHROCYTE [DISTWIDTH] IN BLOOD BY AUTOMATED COUNT: 47.1 FL (ref 35.9–50)
GLOBULIN SER CALC-MCNC: 2.8 G/DL (ref 1.9–3.5)
GLUCOSE SERPL-MCNC: 113 MG/DL (ref 65–99)
HCT VFR BLD AUTO: 43.6 % (ref 42–52)
HGB BLD-MCNC: 14.8 G/DL (ref 14–18)
IMM GRANULOCYTES # BLD AUTO: 0.03 K/UL (ref 0–0.11)
IMM GRANULOCYTES NFR BLD AUTO: 0.3 % (ref 0–0.9)
LIPASE SERPL-CCNC: 15 U/L (ref 11–82)
LYMPHOCYTES # BLD AUTO: 1.45 K/UL (ref 1–4.8)
LYMPHOCYTES NFR BLD: 16.3 % (ref 22–41)
MCH RBC QN AUTO: 33.9 PG (ref 27–33)
MCHC RBC AUTO-ENTMCNC: 33.9 G/DL (ref 33.7–35.3)
MCV RBC AUTO: 100 FL (ref 81.4–97.8)
MONOCYTES # BLD AUTO: 0.86 K/UL (ref 0–0.85)
MONOCYTES NFR BLD AUTO: 9.6 % (ref 0–13.4)
NEUTROPHILS # BLD AUTO: 5.94 K/UL (ref 1.82–7.42)
NEUTROPHILS NFR BLD: 66.6 % (ref 44–72)
NRBC # BLD AUTO: 0 K/UL
NRBC BLD-RTO: 0 /100 WBC
PLATELET # BLD AUTO: 249 K/UL (ref 164–446)
PMV BLD AUTO: 10.3 FL (ref 9–12.9)
POTASSIUM SERPL-SCNC: 3.4 MMOL/L (ref 3.6–5.5)
PROT SERPL-MCNC: 6 G/DL (ref 6–8.2)
RBC # BLD AUTO: 4.36 M/UL (ref 4.7–6.1)
SODIUM SERPL-SCNC: 135 MMOL/L (ref 135–145)
WBC # BLD AUTO: 8.9 K/UL (ref 4.8–10.8)

## 2021-03-25 PROCEDURE — 96374 THER/PROPH/DIAG INJ IV PUSH: CPT

## 2021-03-25 PROCEDURE — 700111 HCHG RX REV CODE 636 W/ 250 OVERRIDE (IP): Performed by: EMERGENCY MEDICINE

## 2021-03-25 PROCEDURE — 700117 HCHG RX CONTRAST REV CODE 255: Performed by: EMERGENCY MEDICINE

## 2021-03-25 PROCEDURE — 99284 EMERGENCY DEPT VISIT MOD MDM: CPT

## 2021-03-25 PROCEDURE — 74177 CT ABD & PELVIS W/CONTRAST: CPT

## 2021-03-25 PROCEDURE — 36415 COLL VENOUS BLD VENIPUNCTURE: CPT

## 2021-03-25 PROCEDURE — 80053 COMPREHEN METABOLIC PANEL: CPT

## 2021-03-25 PROCEDURE — 85025 COMPLETE CBC W/AUTO DIFF WBC: CPT

## 2021-03-25 PROCEDURE — 83690 ASSAY OF LIPASE: CPT

## 2021-03-25 RX ORDER — MORPHINE SULFATE 10 MG/ML
6 INJECTION, SOLUTION INTRAMUSCULAR; INTRAVENOUS ONCE
Status: COMPLETED | OUTPATIENT
Start: 2021-03-25 | End: 2021-03-25

## 2021-03-25 RX ORDER — DICYCLOMINE HCL 20 MG
20 TABLET ORAL EVERY 6 HOURS
Qty: 28 TABLET | Refills: 0 | Status: SHIPPED | OUTPATIENT
Start: 2021-03-25 | End: 2021-04-01

## 2021-03-25 RX ADMIN — IOHEXOL 90 ML: 350 INJECTION, SOLUTION INTRAVENOUS at 19:06

## 2021-03-25 RX ADMIN — MORPHINE SULFATE 6 MG: 10 INJECTION INTRAVENOUS at 17:50

## 2021-03-25 ASSESSMENT — PAIN DESCRIPTION - PAIN TYPE: TYPE: ACUTE PAIN

## 2021-03-25 ASSESSMENT — FIBROSIS 4 INDEX: FIB4 SCORE: 0.69

## 2021-03-25 ASSESSMENT — LIFESTYLE VARIABLES: DO YOU DRINK ALCOHOL: NO

## 2021-03-25 NOTE — ED TRIAGE NOTES
"Jeffrey ReneeNicolas Cerda   45 y.o. male   Chief Complaint   Patient presents with   • Hernia     known umbilical hernia, was here two days ago for the same issue, was given muscle relaxers then and they were able to reduce it, says yesterday it popped out, cannot walk distance per pt due to pain.       Pt amb to triage with steady gait for above complaint. Pt appears uncomfortable in triage.     Pt is alert and oriented, speaking in full sentences, follows commands and responds appropriately to questions. Resp are even and unlabored.   Pt placed in lobby. Pt educated on triage process. Pt encouraged to alert staff for any changes.    /76   Pulse 77   Temp 36.3 °C (97.3 °F) (Temporal)   Resp 20   Ht 1.981 m (6' 6\")   Wt (!) 192 kg (423 lb 4.5 oz)   SpO2 96%   BMI 48.92 kg/m² '  "

## 2021-03-26 NOTE — ED PROVIDER NOTES
"ED Provider Note    CHIEF COMPLAINT  Chief Complaint   Patient presents with   • Hernia     known umbilical hernia, was here two days ago for the same issue, was given muscle relaxers then and they were able to reduce it, says yesterday it popped out, cannot walk distance per pt due to pain.        HPI  Jeffrey Cerda is a 45 y.o. male who presents to the emergency department chief complaint of umbilical hernia and generalized nell discomfort.  The patient was seen here 2 days ago for umbilical hernia which was reduced after pain management.  He states since then it hurts just to walk he still able to reduce it but just the bellybutton is extremely sore.  He also states he has had diarrhea for \"a very long time\" but worse over the last week and has had diminished output today but no nausea vomiting fevers bloody stools bloody emesis.  He has follow-up with general surgery for his umbilical hernia tomorrow.    REVIEW OF SYSTEMS  Positives as above. Pertinent negatives include nausea vomiting fever chills cough congestion constipation bloody stools bloody emesis easy bleeding or bruising dysuria hematuria  All other review of systems are negative    PAST MEDICAL HISTORY       SOCIAL HISTORY  Social History     Tobacco Use   • Smoking status: Current Some Day Smoker     Packs/day: 0.50     Types: Cigarettes   • Smokeless tobacco: Never Used   Substance and Sexual Activity   • Alcohol use: Yes     Comment: Occ   • Drug use: Yes     Types: Marijuana, Inhaled     Comment: thc   • Sexual activity: Not on file       SURGICAL HISTORY  patient denies any surgical history    CURRENT MEDICATIONS  Home Medications     Reviewed by Angelo Wood R.N. (Registered Nurse) on 03/25/21 at 1611  Med List Status: Partial   Medication Last Dose Status   baclofen (LIORESAL) 20 MG tablet  Active   gabapentin (NEURONTIN) 300 MG CAPS  Active   lovastatin (MEVACOR) 20 MG Tab  Active   ondansetron (ZOFRAN ODT) 4 MG TABLET " "DISPERSIBLE  Active   oxyCODONE-acetaminophen (PERCOCET-10)  MG Tab  Active                ALLERGIES  Allergies   Allergen Reactions   • Corticosteroids        PHYSICAL EXAM  VITAL SIGNS: /76   Pulse 77   Temp 36.3 °C (97.3 °F) (Temporal)   Resp 20   Ht 1.981 m (6' 6\")   Wt (!) 192 kg (423 lb 4.5 oz)   SpO2 96%   BMI 48.92 kg/m²    Pulse ox interpretation: I interpret this pulse ox as normal.  Constitutional: Alert in no apparent distress.  HENT: Normocephalic atraumatic, MMM  Eyes: PER, Conjunctiva normal, Non-icteric.   Neck: Normal range of motion, No tenderness, Supple, No stridor.   Cardiovascular: Regular rate and rhythm, no murmurs.   Thorax & Lungs: Normal breath sounds, No respiratory distress, No wheezing, No chest tenderness.   Abdomen: Bowel sounds normal, Soft, generalized tenderness near the umbilicus without erythema his hernia is easily reduced without difficulty no pulsatile masses. No peritoneal signs.  Skin: Warm, Dry, No erythema, No rash.   Back: No bony tenderness, No CVA tenderness.   Extremities/MSK: Intact equal distal pulses, No edema, No tenderness, No cyanosis, no major deformities noted  Neurologic: Alert and oriented x3, No focal deficits noted.       DIFFERENTIAL DIAGNOSIS AND WORK UP PLAN    This is a 45 y.o. male who presents with likely sprain at the periumbilical site due to his recent incarceration of the hernia and that led to prolonged incarceration and then painful procedure to reduce it.  Could have abdominal wall strain or sprain however he is also had a lot of diarrhea this could also be colitis diverticulitis something other than the hernia discomfort no evidence of incarceration at this time we will repeat blood analysis as he was 16 last time and perform a CT scan today he will also be treated with a dose of pain management    DIAGNOSTIC STUDIES / PROCEDURES    EKG  No results found for this or any previous visit.    LABS  Pertinent Lab Findings  CBC " "with white blood cell count is improved to normal from prior without a thrombocytopenia  CMP within normal limits  Potassium 3.4 with a normal lipase      RADIOLOGY  CT-ABDOMEN-PELVIS WITH   Final Result      1.  Wall thickening and edema involving the transverse, descending and proximal sigmoid colon suggesting inflammatory or infectious colitis.   2.  Small amount of associated peritoneal fluid present.   3.  No intra-abdominal abscess or evidence for bowel perforation.   4.  Appendix is not visualized.   5.  Umbilical hernia containing fat and fluid.  No herniated bowel loops.        The radiologist's interpretation of all radiological studies have been reviewed by me.      COURSE & MEDICAL DECISION MAKING  Pertinent Labs & Imaging studies reviewed. (See chart for details)    7:26 PM  I reassessed patient at the bedside is resting comfortably feeling better he is got wall thickening along the whole colon he states has had diarrhea for years but is just been worse in the last week or 2 he is never seen a gastroenterologist is feeling a lot better after the pain management he will be fitted with an abdominal binder and will follow up with general surgery tomorrow for his umbilical hernia there is no evidence of obstruction or incarceration or infection of the umbilical hernia site there is no need for oral antibiotics at this time he will be sent home with Bentyl and a gastroenterology referral for the colitis and strict return precautions.  He understands feels comfortable going home and will take his chronic pain meds as needed    /56   Pulse 75   Temp 36.3 °C (97.3 °F) (Temporal)   Resp 18   Ht 1.981 m (6' 6\")   Wt (!) 192 kg (423 lb 4.5 oz)   SpO2 99%   BMI 48.92 kg/m²       I verified that the patient was wearing a mask and I was wearing appropriate PPE every time I entered the room. The patient's mask was on the patient at all times during my encounter except for a brief view of the " oropharynx.    The patient will return for new or worsening symptoms and is stable at the time of discharge.    The patient is referred to a primary physician for blood pressure management, diabetic screening, and for all other preventative health concerns.    DISPOSITION:  Patient will be discharged home in stable condition.    FOLLOW UP:  [facility], Emergency Dept  [address] [facility] [id number] [phone]    If symptoms worsen    [facility] - [address] [address] [phone]  Call on 3/26/2021        OUTPATIENT MEDICATIONS:  New Prescriptions    DICYCLOMINE (BENTYL) 20 MG TAB    Take 1 tablet by mouth every 6 hours for 7 days.         FINAL IMPRESSION  1. Umbilical hernia without obstruction and without gangrene     2. Colitis  REFERRAL TO GASTROENTEROLOGY           Electronically signed by: Rosana Zheng M.D., 3/25/2021 5:25 PM    This dictation has been created using voice recognition software and/or scribes. The accuracy of the dictation is limited by the abilities of the software and the expertise of the scribes. I expect there may be some errors of grammar and possibly content. I made every attempt to manually correct the errors within my dictation. However, errors related to voice recognition software and/or scribes may still exist and should be interpreted within the appropriate context.

## 2021-03-26 NOTE — ED NOTES
Patient ambulatory from [facility] to [place] [address] with steady gait accompanied by ED RN. Chart up for ERP.

## 2022-12-17 ENCOUNTER — HOSPITAL ENCOUNTER (EMERGENCY)
Facility: MEDICAL CENTER | Age: 47
End: 2022-12-17
Attending: EMERGENCY MEDICINE
Payer: COMMERCIAL

## 2022-12-17 ENCOUNTER — APPOINTMENT (OUTPATIENT)
Dept: RADIOLOGY | Facility: MEDICAL CENTER | Age: 47
End: 2022-12-17
Attending: EMERGENCY MEDICINE
Payer: COMMERCIAL

## 2022-12-17 ENCOUNTER — APPOINTMENT (OUTPATIENT)
Dept: RADIOLOGY | Facility: MEDICAL CENTER | Age: 47
End: 2022-12-17
Payer: COMMERCIAL

## 2022-12-17 VITALS
BODY MASS INDEX: 38.36 KG/M2 | WEIGHT: 315 LBS | TEMPERATURE: 97.4 F | HEART RATE: 71 BPM | HEIGHT: 76 IN | SYSTOLIC BLOOD PRESSURE: 122 MMHG | DIASTOLIC BLOOD PRESSURE: 84 MMHG | RESPIRATION RATE: 16 BRPM | OXYGEN SATURATION: 93 %

## 2022-12-17 DIAGNOSIS — F10.920 ALCOHOLIC INTOXICATION WITHOUT COMPLICATION (HCC): ICD-10-CM

## 2022-12-17 DIAGNOSIS — S00.91XA ABRASION OF HEAD, INITIAL ENCOUNTER: ICD-10-CM
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
## 2022-12-17 LAB
ABO + RH BLD: NORMAL
ABO GROUP BLD: NORMAL
ALBUMIN SERPL BCP-MCNC: 4.4 G/DL (ref 3.2–4.9)
ALBUMIN/GLOB SERPL: 1.8 G/DL
ALP SERPL-CCNC: 85 U/L (ref 30–99)
ALT SERPL-CCNC: 47 U/L (ref 2–50)
ANION GAP SERPL CALC-SCNC: 15 MMOL/L (ref 7–16)
APTT PPP: 25 SEC (ref 24.7–36)
AST SERPL-CCNC: 40 U/L (ref 12–45)
BILIRUB SERPL-MCNC: 0.6 MG/DL (ref 0.1–1.5)
BLD GP AB SCN SERPL QL: NORMAL
BUN SERPL-MCNC: 13 MG/DL (ref 8–22)
CALCIUM ALBUM COR SERPL-MCNC: 8.8 MG/DL (ref 8.5–10.5)
CALCIUM SERPL-MCNC: 9.1 MG/DL (ref 8.5–10.5)
CHLORIDE SERPL-SCNC: 104 MMOL/L (ref 96–112)
CO2 SERPL-SCNC: 20 MMOL/L (ref 20–33)
CREAT SERPL-MCNC: 0.98 MG/DL (ref 0.5–1.4)
ERYTHROCYTE [DISTWIDTH] IN BLOOD BY AUTOMATED COUNT: 45.1 FL (ref 35.9–50)
ETHANOL BLD-MCNC: 185.2 MG/DL
GFR SERPLBLD CREATININE-BSD FMLA CKD-EPI: 94 ML/MIN/1.73 M 2
GLOBULIN SER CALC-MCNC: 2.4 G/DL (ref 1.9–3.5)
GLUCOSE SERPL-MCNC: 102 MG/DL (ref 65–99)
HCT VFR BLD AUTO: 46.1 % (ref 42–52)
HGB BLD-MCNC: 16.4 G/DL (ref 14–18)
INR PPP: 0.99 (ref 0.87–1.13)
MCH RBC QN AUTO: 35.6 PG (ref 27–33)
MCHC RBC AUTO-ENTMCNC: 35.6 G/DL (ref 33.7–35.3)
MCV RBC AUTO: 100 FL (ref 81.4–97.8)
PLATELET # BLD AUTO: 201 K/UL (ref 164–446)
PMV BLD AUTO: 10 FL (ref 9–12.9)
POTASSIUM SERPL-SCNC: 4.2 MMOL/L (ref 3.6–5.5)
PROT SERPL-MCNC: 6.8 G/DL (ref 6–8.2)
PROTHROMBIN TIME: 13 SEC (ref 12–14.6)
RBC # BLD AUTO: 4.61 M/UL (ref 4.7–6.1)
RH BLD: NORMAL
SODIUM SERPL-SCNC: 139 MMOL/L (ref 135–145)
WBC # BLD AUTO: 6.3 K/UL (ref 4.8–10.8)

## 2022-12-17 PROCEDURE — 72125 CT NECK SPINE W/O DYE: CPT

## 2022-12-17 PROCEDURE — 305948 HCHG GREEN TRAUMA ACT PRE-NOTIFY NO CC

## 2022-12-17 PROCEDURE — 85730 THROMBOPLASTIN TIME PARTIAL: CPT

## 2022-12-17 PROCEDURE — 72170 X-RAY EXAM OF PELVIS: CPT

## 2022-12-17 PROCEDURE — 73502 X-RAY EXAM HIP UNI 2-3 VIEWS: CPT | Mod: RT

## 2022-12-17 PROCEDURE — 71045 X-RAY EXAM CHEST 1 VIEW: CPT

## 2022-12-17 PROCEDURE — 700111 HCHG RX REV CODE 636 W/ 250 OVERRIDE (IP): Performed by: EMERGENCY MEDICINE

## 2022-12-17 PROCEDURE — 71260 CT THORAX DX C+: CPT

## 2022-12-17 PROCEDURE — 700117 HCHG RX CONTRAST REV CODE 255: Performed by: EMERGENCY MEDICINE

## 2022-12-17 PROCEDURE — 99285 EMERGENCY DEPT VISIT HI MDM: CPT

## 2022-12-17 PROCEDURE — 96375 TX/PRO/DX INJ NEW DRUG ADDON: CPT

## 2022-12-17 PROCEDURE — 72131 CT LUMBAR SPINE W/O DYE: CPT

## 2022-12-17 PROCEDURE — 80053 COMPREHEN METABOLIC PANEL: CPT

## 2022-12-17 PROCEDURE — 86901 BLOOD TYPING SEROLOGIC RH(D): CPT

## 2022-12-17 PROCEDURE — 82077 ASSAY SPEC XCP UR&BREATH IA: CPT

## 2022-12-17 PROCEDURE — 70450 CT HEAD/BRAIN W/O DYE: CPT

## 2022-12-17 PROCEDURE — 72128 CT CHEST SPINE W/O DYE: CPT

## 2022-12-17 PROCEDURE — 86900 BLOOD TYPING SEROLOGIC ABO: CPT

## 2022-12-17 PROCEDURE — 96374 THER/PROPH/DIAG INJ IV PUSH: CPT

## 2022-12-17 PROCEDURE — 36415 COLL VENOUS BLD VENIPUNCTURE: CPT

## 2022-12-17 PROCEDURE — 73560 X-RAY EXAM OF KNEE 1 OR 2: CPT | Mod: LT

## 2022-12-17 PROCEDURE — 85027 COMPLETE CBC AUTOMATED: CPT

## 2022-12-17 PROCEDURE — 86850 RBC ANTIBODY SCREEN: CPT

## 2022-12-17 PROCEDURE — 85610 PROTHROMBIN TIME: CPT

## 2022-12-17 PROCEDURE — 73552 X-RAY EXAM OF FEMUR 2/>: CPT | Mod: LT

## 2022-12-17 RX ORDER — OXYCODONE AND ACETAMINOPHEN 10; 325 MG/1; MG/1
1 TABLET ORAL EVERY 6 HOURS PRN
COMMUNITY

## 2022-12-17 RX ORDER — ONDANSETRON 2 MG/ML
4 INJECTION INTRAMUSCULAR; INTRAVENOUS ONCE
Status: COMPLETED | OUTPATIENT
Start: 2022-12-17 | End: 2022-12-17

## 2022-12-17 RX ORDER — MORPHINE SULFATE 4 MG/ML
4 INJECTION INTRAVENOUS ONCE
Status: COMPLETED | OUTPATIENT
Start: 2022-12-17 | End: 2022-12-17

## 2022-12-17 RX ADMIN — MORPHINE SULFATE 4 MG: 4 INJECTION INTRAVENOUS at 21:25

## 2022-12-17 RX ADMIN — IOHEXOL 100 ML: 350 INJECTION, SOLUTION INTRAVENOUS at 19:48

## 2022-12-17 RX ADMIN — ONDANSETRON 4 MG: 2 INJECTION INTRAMUSCULAR; INTRAVENOUS at 21:25

## 2022-12-18 NOTE — ED NOTES
Pt aox4, skin pink warm and dry, airway patent, rr even and unlabored, NAD noted. No new complaints. Pt vss. Ambulates upon discharge with PD without new incident or distress.
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
## 2023-09-21 ENCOUNTER — APPOINTMENT (OUTPATIENT)
Dept: ADMISSIONS | Facility: MEDICAL CENTER | Age: 48
End: 2023-09-21
Attending: STUDENT IN AN ORGANIZED HEALTH CARE EDUCATION/TRAINING PROGRAM
Payer: COMMERCIAL

## 2023-09-22 ENCOUNTER — PRE-ADMISSION TESTING (OUTPATIENT)
Dept: ADMISSIONS | Facility: MEDICAL CENTER | Age: 48
End: 2023-09-22
Attending: STUDENT IN AN ORGANIZED HEALTH CARE EDUCATION/TRAINING PROGRAM
Payer: COMMERCIAL

## 2023-09-22 VITALS — HEIGHT: 78 IN | BODY MASS INDEX: 46.22 KG/M2

## 2023-09-22 RX ORDER — GABAPENTIN 600 MG/1
600 TABLET ORAL 4 TIMES DAILY
COMMUNITY
Start: 2023-09-18

## 2023-09-22 NOTE — PREPROCEDURE INSTRUCTIONS
Pt preadmitted via phone, instructions emailed. Questions answered. Pt instructed, per pharmacy guidelines, to continue or hold regularly prescribed medications prior to procedure. Per pharmacy guidelines, instructed to take these medications the day of procedure- baclofen, percocet and gabapentin. METs score >4.

## 2023-09-26 ENCOUNTER — ANESTHESIA EVENT (OUTPATIENT)
Dept: SURGERY | Facility: MEDICAL CENTER | Age: 48
End: 2023-09-26
Payer: COMMERCIAL

## 2023-09-27 ENCOUNTER — ANESTHESIA (OUTPATIENT)
Dept: SURGERY | Facility: MEDICAL CENTER | Age: 48
End: 2023-09-27
Payer: COMMERCIAL

## 2023-09-27 ENCOUNTER — HOSPITAL ENCOUNTER (OUTPATIENT)
Facility: MEDICAL CENTER | Age: 48
End: 2023-09-27
Attending: STUDENT IN AN ORGANIZED HEALTH CARE EDUCATION/TRAINING PROGRAM | Admitting: STUDENT IN AN ORGANIZED HEALTH CARE EDUCATION/TRAINING PROGRAM
Payer: COMMERCIAL

## 2023-09-27 VITALS
BODY MASS INDEX: 36.45 KG/M2 | HEIGHT: 78 IN | RESPIRATION RATE: 18 BRPM | HEART RATE: 55 BPM | OXYGEN SATURATION: 95 % | WEIGHT: 315 LBS | TEMPERATURE: 96.8 F | SYSTOLIC BLOOD PRESSURE: 126 MMHG | DIASTOLIC BLOOD PRESSURE: 79 MMHG

## 2023-09-27 LAB
ANION GAP SERPL CALC-SCNC: 11 MMOL/L (ref 7–16)
BUN SERPL-MCNC: 10 MG/DL (ref 8–22)
CALCIUM SERPL-MCNC: 8.8 MG/DL (ref 8.4–10.2)
CHLORIDE SERPL-SCNC: 107 MMOL/L (ref 96–112)
CO2 SERPL-SCNC: 26 MMOL/L (ref 20–33)
CREAT SERPL-MCNC: 0.85 MG/DL (ref 0.5–1.4)
EKG IMPRESSION: NORMAL
ERYTHROCYTE [DISTWIDTH] IN BLOOD BY AUTOMATED COUNT: 44.3 FL (ref 35.9–50)
GFR SERPLBLD CREATININE-BSD FMLA CKD-EPI: 106 ML/MIN/1.73 M 2
GLUCOSE SERPL-MCNC: 108 MG/DL (ref 65–99)
HCT VFR BLD AUTO: 42.5 % (ref 42–52)
HGB BLD-MCNC: 14.9 G/DL (ref 14–18)
MCH RBC QN AUTO: 34.5 PG (ref 27–33)
MCHC RBC AUTO-ENTMCNC: 35.1 G/DL (ref 32.3–36.5)
MCV RBC AUTO: 98.4 FL (ref 81.4–97.8)
PLATELET # BLD AUTO: 189 K/UL (ref 164–446)
PMV BLD AUTO: 9.9 FL (ref 9–12.9)
POTASSIUM SERPL-SCNC: 3.9 MMOL/L (ref 3.6–5.5)
RBC # BLD AUTO: 4.32 M/UL (ref 4.7–6.1)
SODIUM SERPL-SCNC: 144 MMOL/L (ref 135–145)
WBC # BLD AUTO: 8.7 K/UL (ref 4.8–10.8)

## 2023-09-27 PROCEDURE — 160036 HCHG PACU - EA ADDL 30 MINS PHASE I: Performed by: STUDENT IN AN ORGANIZED HEALTH CARE EDUCATION/TRAINING PROGRAM

## 2023-09-27 PROCEDURE — 700105 HCHG RX REV CODE 258: Performed by: STUDENT IN AN ORGANIZED HEALTH CARE EDUCATION/TRAINING PROGRAM

## 2023-09-27 PROCEDURE — 93005 ELECTROCARDIOGRAM TRACING: CPT | Performed by: STUDENT IN AN ORGANIZED HEALTH CARE EDUCATION/TRAINING PROGRAM

## 2023-09-27 PROCEDURE — 700101 HCHG RX REV CODE 250: Performed by: STUDENT IN AN ORGANIZED HEALTH CARE EDUCATION/TRAINING PROGRAM

## 2023-09-27 PROCEDURE — 160041 HCHG SURGERY MINUTES - EA ADDL 1 MIN LEVEL 4: Performed by: STUDENT IN AN ORGANIZED HEALTH CARE EDUCATION/TRAINING PROGRAM

## 2023-09-27 PROCEDURE — 64415 NJX AA&/STRD BRCH PLXS IMG: CPT | Performed by: STUDENT IN AN ORGANIZED HEALTH CARE EDUCATION/TRAINING PROGRAM

## 2023-09-27 PROCEDURE — 93010 ELECTROCARDIOGRAM REPORT: CPT | Performed by: INTERNAL MEDICINE

## 2023-09-27 PROCEDURE — 160048 HCHG OR STATISTICAL LEVEL 1-5: Performed by: STUDENT IN AN ORGANIZED HEALTH CARE EDUCATION/TRAINING PROGRAM

## 2023-09-27 PROCEDURE — 160046 HCHG PACU - 1ST 60 MINS PHASE II: Performed by: STUDENT IN AN ORGANIZED HEALTH CARE EDUCATION/TRAINING PROGRAM

## 2023-09-27 PROCEDURE — 160029 HCHG SURGERY MINUTES - 1ST 30 MINS LEVEL 4: Performed by: STUDENT IN AN ORGANIZED HEALTH CARE EDUCATION/TRAINING PROGRAM

## 2023-09-27 PROCEDURE — 160035 HCHG PACU - 1ST 60 MINS PHASE I: Performed by: STUDENT IN AN ORGANIZED HEALTH CARE EDUCATION/TRAINING PROGRAM

## 2023-09-27 PROCEDURE — 85027 COMPLETE CBC AUTOMATED: CPT

## 2023-09-27 PROCEDURE — 80048 BASIC METABOLIC PNL TOTAL CA: CPT

## 2023-09-27 PROCEDURE — 160002 HCHG RECOVERY MINUTES (STAT): Performed by: STUDENT IN AN ORGANIZED HEALTH CARE EDUCATION/TRAINING PROGRAM

## 2023-09-27 PROCEDURE — 700111 HCHG RX REV CODE 636 W/ 250 OVERRIDE (IP): Performed by: STUDENT IN AN ORGANIZED HEALTH CARE EDUCATION/TRAINING PROGRAM

## 2023-09-27 PROCEDURE — 160009 HCHG ANES TIME/MIN: Performed by: STUDENT IN AN ORGANIZED HEALTH CARE EDUCATION/TRAINING PROGRAM

## 2023-09-27 PROCEDURE — 160025 RECOVERY II MINUTES (STATS): Performed by: STUDENT IN AN ORGANIZED HEALTH CARE EDUCATION/TRAINING PROGRAM

## 2023-09-27 RX ORDER — HYDROMORPHONE HYDROCHLORIDE 1 MG/ML
0.2 INJECTION, SOLUTION INTRAMUSCULAR; INTRAVENOUS; SUBCUTANEOUS
Status: DISCONTINUED | OUTPATIENT
Start: 2023-09-27 | End: 2023-09-27 | Stop reason: HOSPADM

## 2023-09-27 RX ORDER — CEFAZOLIN SODIUM 1 G/3ML
INJECTION, POWDER, FOR SOLUTION INTRAMUSCULAR; INTRAVENOUS PRN
Status: DISCONTINUED | OUTPATIENT
Start: 2023-09-27 | End: 2023-09-27 | Stop reason: SURG

## 2023-09-27 RX ORDER — OXYCODONE HCL 5 MG/5 ML
10 SOLUTION, ORAL ORAL
Status: DISCONTINUED | OUTPATIENT
Start: 2023-09-27 | End: 2023-09-27 | Stop reason: HOSPADM

## 2023-09-27 RX ORDER — DIPHENHYDRAMINE HYDROCHLORIDE 50 MG/ML
12.5 INJECTION INTRAMUSCULAR; INTRAVENOUS
Status: DISCONTINUED | OUTPATIENT
Start: 2023-09-27 | End: 2023-09-27 | Stop reason: HOSPADM

## 2023-09-27 RX ORDER — HYDROMORPHONE HYDROCHLORIDE 1 MG/ML
0.4 INJECTION, SOLUTION INTRAMUSCULAR; INTRAVENOUS; SUBCUTANEOUS
Status: DISCONTINUED | OUTPATIENT
Start: 2023-09-27 | End: 2023-09-27 | Stop reason: HOSPADM

## 2023-09-27 RX ORDER — HALOPERIDOL 5 MG/ML
1 INJECTION INTRAMUSCULAR
Status: DISCONTINUED | OUTPATIENT
Start: 2023-09-27 | End: 2023-09-27 | Stop reason: HOSPADM

## 2023-09-27 RX ORDER — ONDANSETRON 2 MG/ML
4 INJECTION INTRAMUSCULAR; INTRAVENOUS
Status: DISCONTINUED | OUTPATIENT
Start: 2023-09-27 | End: 2023-09-27 | Stop reason: HOSPADM

## 2023-09-27 RX ORDER — OXYCODONE HCL 5 MG/5 ML
5 SOLUTION, ORAL ORAL
Status: DISCONTINUED | OUTPATIENT
Start: 2023-09-27 | End: 2023-09-27 | Stop reason: HOSPADM

## 2023-09-27 RX ORDER — SODIUM CHLORIDE, SODIUM LACTATE, POTASSIUM CHLORIDE, CALCIUM CHLORIDE 600; 310; 30; 20 MG/100ML; MG/100ML; MG/100ML; MG/100ML
INJECTION, SOLUTION INTRAVENOUS
Status: DISCONTINUED | OUTPATIENT
Start: 2023-09-27 | End: 2023-09-27 | Stop reason: SURG

## 2023-09-27 RX ORDER — ONDANSETRON 2 MG/ML
INJECTION INTRAMUSCULAR; INTRAVENOUS PRN
Status: DISCONTINUED | OUTPATIENT
Start: 2023-09-27 | End: 2023-09-27 | Stop reason: SURG

## 2023-09-27 RX ORDER — KETOROLAC TROMETHAMINE 30 MG/ML
INJECTION, SOLUTION INTRAMUSCULAR; INTRAVENOUS PRN
Status: DISCONTINUED | OUTPATIENT
Start: 2023-09-27 | End: 2023-09-27 | Stop reason: SURG

## 2023-09-27 RX ORDER — HYDROMORPHONE HYDROCHLORIDE 1 MG/ML
0.1 INJECTION, SOLUTION INTRAMUSCULAR; INTRAVENOUS; SUBCUTANEOUS
Status: DISCONTINUED | OUTPATIENT
Start: 2023-09-27 | End: 2023-09-27 | Stop reason: HOSPADM

## 2023-09-27 RX ORDER — MIDAZOLAM HYDROCHLORIDE 1 MG/ML
INJECTION INTRAMUSCULAR; INTRAVENOUS PRN
Status: DISCONTINUED | OUTPATIENT
Start: 2023-09-27 | End: 2023-09-27 | Stop reason: SURG

## 2023-09-27 RX ORDER — ROPIVACAINE HYDROCHLORIDE 5 MG/ML
INJECTION, SOLUTION EPIDURAL; INFILTRATION; PERINEURAL
Status: COMPLETED | OUTPATIENT
Start: 2023-09-27 | End: 2023-09-27

## 2023-09-27 RX ORDER — DEXAMETHASONE SODIUM PHOSPHATE 4 MG/ML
INJECTION, SOLUTION INTRA-ARTICULAR; INTRALESIONAL; INTRAMUSCULAR; INTRAVENOUS; SOFT TISSUE PRN
Status: DISCONTINUED | OUTPATIENT
Start: 2023-09-27 | End: 2023-09-27 | Stop reason: SURG

## 2023-09-27 RX ORDER — TRANEXAMIC ACID 100 MG/ML
INJECTION, SOLUTION INTRAVENOUS PRN
Status: DISCONTINUED | OUTPATIENT
Start: 2023-09-27 | End: 2023-09-27 | Stop reason: SURG

## 2023-09-27 RX ORDER — SODIUM CHLORIDE, SODIUM LACTATE, POTASSIUM CHLORIDE, CALCIUM CHLORIDE 600; 310; 30; 20 MG/100ML; MG/100ML; MG/100ML; MG/100ML
INJECTION, SOLUTION INTRAVENOUS CONTINUOUS
Status: DISCONTINUED | OUTPATIENT
Start: 2023-09-27 | End: 2023-09-27

## 2023-09-27 RX ORDER — ROCURONIUM BROMIDE 10 MG/ML
INJECTION, SOLUTION INTRAVENOUS PRN
Status: DISCONTINUED | OUTPATIENT
Start: 2023-09-27 | End: 2023-09-27 | Stop reason: SURG

## 2023-09-27 RX ADMIN — SUGAMMADEX 200 MG: 100 INJECTION, SOLUTION INTRAVENOUS at 12:37

## 2023-09-27 RX ADMIN — FENTANYL CITRATE 100 MCG: 50 INJECTION, SOLUTION INTRAMUSCULAR; INTRAVENOUS at 11:43

## 2023-09-27 RX ADMIN — MIDAZOLAM 2 MG: 1 INJECTION, SOLUTION INTRAMUSCULAR; INTRAVENOUS at 11:43

## 2023-09-27 RX ADMIN — ROPIVACAINE HYDROCHLORIDE 20 ML: 5 INJECTION EPIDURAL; INFILTRATION; PERINEURAL at 11:22

## 2023-09-27 RX ADMIN — PROPOFOL 180 MG: 10 INJECTION, EMULSION INTRAVENOUS at 11:43

## 2023-09-27 RX ADMIN — CEFAZOLIN 3 G: 1 INJECTION, POWDER, FOR SOLUTION INTRAMUSCULAR; INTRAVENOUS at 11:43

## 2023-09-27 RX ADMIN — TRANEXAMIC ACID 1000 MG: 100 INJECTION, SOLUTION INTRAVENOUS at 11:43

## 2023-09-27 RX ADMIN — SODIUM CHLORIDE, POTASSIUM CHLORIDE, SODIUM LACTATE AND CALCIUM CHLORIDE: 600; 310; 30; 20 INJECTION, SOLUTION INTRAVENOUS at 11:35

## 2023-09-27 RX ADMIN — ROCURONIUM BROMIDE 70 MG: 50 INJECTION, SOLUTION INTRAVENOUS at 11:43

## 2023-09-27 RX ADMIN — KETOROLAC TROMETHAMINE 30 MG: 30 INJECTION, SOLUTION INTRAMUSCULAR; INTRAVENOUS at 12:24

## 2023-09-27 RX ADMIN — DEXAMETHASONE SODIUM PHOSPHATE 4 MG: 4 INJECTION INTRA-ARTICULAR; INTRALESIONAL; INTRAMUSCULAR; INTRAVENOUS; SOFT TISSUE at 11:55

## 2023-09-27 RX ADMIN — ONDANSETRON 4 MG: 2 INJECTION INTRAMUSCULAR; INTRAVENOUS at 11:55

## 2023-09-27 ASSESSMENT — FIBROSIS 4 INDEX: FIB4 SCORE: 1.42

## 2023-09-27 ASSESSMENT — PAIN DESCRIPTION - PAIN TYPE
TYPE: SURGICAL PAIN
TYPE: SURGICAL PAIN
TYPE: ACUTE PAIN

## 2023-09-27 NOTE — OR NURSING
1243: To PACU from OR via gurradha,  sleeping, respirations spontaneous and non-labored via OPA. Stable on 6L O2 via mask. Ice pack applied over c/d/i left shoulder surgical dressings. LUE CMS intact. Plan to keep pt in PACU for full hour per [facility] protocol.     1300: Pt resting comfortably at this time. Denies pain and nausea. Stable on 5L mask.     1315: Pt resting comfortably at this time. Placed on 2L NC. Denies pain and nausea.    1330: Updated pt wife via phone. No change, pt resting comfortably. Stable on 1L NC.    1345: Pt sleeping at this time. Stable on 1L NC.     1400: Pt awake. Denies pain and nausea. Given IS and instructed on use with goal of 3800, pt currently inhaling volumes of approx 750.      1415: Meets criteria to transfer to Stage 2.

## 2023-09-27 NOTE — H&P
"Surgery Orthopedic History & Physical Note    Date  9/27/2023    Primary Care Physician  Pcp Pt States None    CC  Left shoulder pain    HPI  This is a 47 y.o. male who presented with left shoulder pain for several years after an electrocution.  He had frozen shoulder done several rounds of physical therapy corticosteroid injections he still has significant pain and lack of motion.    Past Medical History:   Diagnosis Date    Pain     Left \"side and back\"    Seizure (HCC) 2003    with cortisol shot, none since    Snoring     no studies    Umbilical hernia        Past Surgical History:   Procedure Laterality Date    APPENDECTOMY      LUMBAR FUSION POSTERIOR PERCUTANEOUS      failed       Current Facility-Administered Medications   Medication Dose Route Frequency Provider Last Rate Last Admin    lidocaine (Xylocaine) 1 % injection 0.5 mL  0.5 mL Intradermal Once PRN Bethel Parnell M.D.        lactated ringers infusion   Intravenous Continuous Bethel Parnell M.D.           Social History     Socioeconomic History    Marital status:      Spouse name: Not on file    Number of children: Not on file    Years of education: Not on file    Highest education level: Not on file   Occupational History    Not on file   Tobacco Use    Smoking status: Every Day     Types: Cigarettes    Smokeless tobacco: Never    Tobacco comments:     States smoke 0.5 packs per week   Vaping Use    Vaping Use: Former    Substances: Nicotine, THC, CBD, Flavoring   Substance and Sexual Activity    Alcohol use: Not Currently     Comment: socially    Drug use: Not Currently     Frequency: 7.0 times per week     Types: Marijuana, Inhaled     Comment: thc    Sexual activity: Not on file   Other Topics Concern    Not on file   Social History Narrative    ** Merged History Encounter **          Social Determinants of Health     Financial Resource Strain: Not on file   Food Insecurity: Not on file   Transportation Needs: Not on file   Physical " Activity: Not on file   Stress: Not on file   Social Connections: Not on file   Intimate Partner Violence: Not on file   Housing Stability: Not on file       No family history on file.    Allergies  Corticosteroids and Cortisol    Review of Systems  Negative    Physical Exam  Left shoulder:  Pain at endpoints of rotation with abduction external rotation  Limited motion overall actively and passively  Vital Signs  Blood Pressure: 130/81   Temperature: 36 °C (96.8 °F)   Pulse: 64   Respiration: 18   Pulse Oximetry: 95 %       Labs:                    Radiology:  MRI of the left shoulder previously done showing no tearing thickening of the capsule consistent with adhesive capsulitis    Assessment/Plan:  47-year-old male with left shoulder adhesive capsulitis after an electrocution    Patient has failed extensive conservative treatment with several rounds of physical therapy corticosteroid injections still having pain with any range of motion.  We had a discussion about continued nonoperative treatment versus operative treatment he wanted proceed with a left shoulder arthroscopic lysis of adhesions and manipulation under anesthesia.  Risk benefits alternatives were all discussed he wants to proceed.  He does have physical therapy scheduled for tomorrow and 3 times per week to continue with the postoperative plan.

## 2023-09-27 NOTE — ANESTHESIA PREPROCEDURE EVALUATION
Case: [id number] Date/Time: 09/27/23 1145    Procedure: LEFT SHOULDER ARTHROSCOPIC LYSIS OF ADHESIONS AND MANIPULATION UNDER ANESTHESIA (Left)    Pre-op diagnosis: ADHESIVE CAPSULITIS OF LEFT SHOULDER    Location: [facility] OR 05 / SURGERY [facility]    Surgeons: Bethel Parnell M.D.      48yo M w/ BMI 46.8, chronic back pain on chronic opioid use. METS >4.    Relevant Problems   No relevant active problems       Physical Exam    Airway   Mallampati: III  TM distance: >3 FB  Neck ROM: full       Cardiovascular - normal exam  Rhythm: regular  Rate: normal  (-) murmur     Dental - normal exam        Facial Hair   Pulmonary - normal exam  Breath sounds clear to auscultation     Abdominal    Neurological - normal exam                 Anesthesia Plan    ASA 3   ASA physical status 3 criteria: morbid obesity - BMI greater than or equal to 40    Plan - general and peripheral nerve block     Peripheral nerve block will be post-op pain control  Airway plan will be ETT          Induction: intravenous    Postoperative Plan: Postoperative administration of opioids is intended.    Pertinent diagnostic labs and testing reviewed    Informed Consent:    Anesthetic plan and risks discussed with patient.    Use of blood products discussed with: patient whom consented to blood products.

## 2023-09-27 NOTE — ANESTHESIA PROCEDURE NOTES
Airway    Date/Time: 9/27/2023 11:46 AM    Performed by: Álvaro Zabala M.D.  Authorized by: Álvaro Zabala M.D.    Location:  OR  Urgency:  Elective  Indications for Airway Management:  Anesthesia      Spontaneous Ventilation: absent    Sedation Level:  Deep  Preoxygenated: Yes    Patient Position:  Sniffing  Mask Difficulty Assessment:  1 - vent by mask  Final Airway Type:  Endotracheal airway  Final Endotracheal Airway:  ETT  Cuffed: Yes    Technique Used for Successful ETT Placement:  Video laryngoscopy    Insertion Site:  Oral  Blade Type:  Glide  Laryngoscope Blade/Videolaryngoscope Blade Size:  4  ETT Size (mm):  7.5  Measured from:  Teeth  ETT to Teeth (cm):  21  Placement Verified by: capnometry    Cormack-Lehane Classification:  Grade IIa - partial view of glottis  Number of Attempts at Approach:  1

## 2023-09-27 NOTE — DISCHARGE INSTRUCTIONS
What to Expect Post Anesthesia    Rest and take it easy for the first 24 hours.  A responsible adult is recommended to remain with you during that time.  It is normal to feel sleepy.  We encourage you to not do anything that requires balance, judgment or coordination.    FOR 24 HOURS DO NOT:  Drive, operate machinery or run household appliances.  Drink beer or alcoholic beverages.  Make important decisions or sign legal documents.    To avoid nausea, slowly advance diet as tolerated, avoiding spicy or greasy foods for the first day.  Add more substantial food to your diet according to your provider's instructions.   INCREASE FLUIDS AND FIBER TO AVOID CONSTIPATION.    MILD FLU-LIKE SYMPTOMS ARE NORMAL.  YOU MAY EXPERIENCE GENERALIZED MUSCLE ACHES, THROAT IRRITATION, HEADACHE AND/OR SOME NAUSEA.    If any questions arise, call your provider.  If your provider is not available, please feel free to call the Surgical Center at [phone].    MEDICATIONS: Resume taking daily medication.  Take prescribed pain medication with food.  If no medication is prescribed, you may take non-aspirin pain medication if needed.  PAIN MEDICATION CAN BE VERY CONSTIPATING.  Take a stool softener or laxative such as senokot, pericolace, or milk of magnesia if needed.    Last pain medication given at _________________          Peripheral Nerve Block Discharge Instructions from Same Day Surgery and Inpatient :    What to Expect - Upper Extremity  You may experience numbness and weakness in shoulder, arm, and hand  on the same side as your surgery  This is normal. For some people, this may be an unpleasant sensation. Be very careful with your numb limb  Ask for help when you need it  Shoulder Surgery Side Effects  In addition to numbness and weakness you may experience other symptoms  Other nerves that are close to those nerves injected can also be affected by local anesthesia  You may experience a hoarseness in your voice  Your breathing  "may feel different  You may also notice drooping of your eyelid, pupil constriction, and decreased sweating, on the side of your surgery  All of these side effects are normal and will resolve when the local anesthetic wears off   Prevent Injury  Protect the limb like a baby  Beware of exposing your limb to extreme heat or cold or trauma  The limb may be injured without you noticing because it is numb  Keep the limb elevated whenever possible  Do not sleep on the limb  Change the position of the limb regularly  Avoid putting pressure on your surgical limb  Pain Control  The initial block on the day of surgery will make your extremity feel \"numb\"  Any consecutive injection including prior to discharge from the hospital will make your extremity feel \"numb\"  You may feel an aching or burning when the local anesthesia starts to wear off  Take pain pills as prescribed by your surgeon  Call your surgeon or anesthesiologist if you do not have adequate pain control      "

## 2023-09-27 NOTE — OP REPORT
Operative report    PreOp Diagnosis: Left shoulder frozen shoulder      PostOp Diagnosis: Same      Procedure(s):  LEFT SHOULDER ARTHROSCOPIC LYSIS OF ADHESIONS AND MANIPULATION UNDER ANESTHESIA - Wound Class: Clean    Surgeon(s):  Bethel Parnell M.D.    Anesthesiologist/Type of Anesthesia:  Anesthesiologist: Álvaro Zabala M.D./General    Surgical Staff:  Circulator: James Khan R.N.  Relief Circulator: Eliana Pulliam R.N.  Scrub Person: Yvonne Wright; Carson Garber [facility] A surgical assistant was required for this procedure for multiple reasons.  This is a complicated procedure that requires surgically trained assistant to assist in patient positioning, tissue handling and exposure, as well as, assistance with surgical wound closure.  There were multiple maneuvers being performed simultaneously that enemy able to accomplish without an assistant.  Performing this procedure alone would increase the complexity, the operating time, the anesthesia time and potentially increased perioperative complication rates.      Specimens removed if any:  * No specimens in log *    Estimated Blood Loss: Minimal    Findings: Exam in the anesthesia there was some stiffness full range of motion in forward flexion as well as abduction external rotation and external rotation at the side  Diagnostic arthroscopy no significant chondromalacia symptomatic degenerative fraying anterior superior labrum rotator interval was irritated and scarred in the inferior capsule was capacious extensive bursitis in the subacromial space no downsloping acromion no rotator cuff tear    Complications: None    Indications for surgery:  This is a 47-year-old gentleman who had an electrocution event has had frozen shoulder since he has had pain for 2 years of the shoulder especially within range of motion.  We had discussion about operative nonoperative treatment failed therapy multiple corticosteroids and injections wanted proceed with  left shoulder arthroscopic lysis of adhesions and manipulation under anesthesia risk benefits alternatives were discussed he wanted proceed.    Procedure in detail:  Patient was met in the preoperative holding her left shoulder was marked as benefits alternatives again discussed and consent was signed.  He underwent interscalene block taken back to operative suite induced under general anesthesia placed in the lateral position left shoulder was prepped and draped in usual sterile fashion and timeout performed verifying surgical site surgical procedure perioperative antibiotics implants staff.  We began by making a posterior portal from outside and fashion into portal under direct visualization with a spinal needle.  Diagnostic arthroscopy was performed and the findings listed above we performed a complete rotator interval release from the subscap up to the supraspinatus into the depth of the conjoined tendon indicating that we got the full release performed the inferior capsule was fairly capacious and lax as a result we did not release this we did debride the anterior superior and posterior labrum as well.  We then went to the subacromial space there was some extensive bursitis that.  Made an lateral working portal and debrided out the bursitis there is no downsloping acromion so we did not do any decompression.  At this point we removed all the instruments gently manipulated the shoulder had full range of motion we then closed with 3-0 Monocryl buried interrupted fashion Steri-Strips for the skin sterile dressings and sling were applied patient was awoken from general anesthesia taken the PACU without complications.

## 2023-09-27 NOTE — ANESTHESIA TIME REPORT
Anesthesia Start and Stop Event Times     Date Time Event    9/27/2023 1103 Ready for Procedure     1135 Anesthesia Start     1245 Anesthesia Stop        Responsible Staff  09/27/23    Name Role Begin End    Min GAVINO Zabala M.D. Anesth 1135 1245        Overtime Reason:  no overtime (within assigned shift)    Comments:                                                       Medicare Part B Preventive Services Guidelines/Limitations Date last completed and Frequency Due Date   Bone Mass Measurement  (age 72 & older, biennial) Requires diagnosis related to osteoporosis or estrogen deficiency. Biennial benefit unless patient has history of long-term glucocorticoid tx or baseline is needed because initial test was by other method Completed 4/2018     Recommended every 2 years Due 4/2020   Cardiovascular Screening Blood Tests (every 5 years)  Total cholesterol, HDL, Triglycerides Order as a panel if possible Completed 7/2019      Recommended annually Due 7/2020    Colorectal Cancer Screening  -Fecal occult blood test (annual)  -Flexible sigmoidoscopy (5y)  -Screening colonoscopy (10y)  -Barium Enema   Completed 8/7/18 with Dr. Erika Barriga  [facility] [id number]   Counseling to Prevent Tobacco Use (up to 8 sessions per year)  - Counseling greater than 3 and up to 10 minutes  - Counseling greater than 10 minutes Patients must be asymptomatic of tobacco-related conditions to receive as preventive service N/A N/A   Diabetes Screening Tests (at least every 3 years, Medicare covers annually or at 6-month intervals for prediabetic patients)     Fasting blood sugar (FBS) or glucose tolerance test (GTT) Patient must be diagnosed with one of the following:  -Hypertension, Dyslipidemia, obesity, previous impaired FBS or GTT  Or any two of the following: overweight, FH of diabetes, age ? 72, history of gestational diabetes, birth of baby weighing more than 9 pounds Completed 1/2020  with A1C 6.9      Recommended every 3-6 months for Pre-Diabetics and Diabetics Due 4/2020-7/2020    Diabetes Self-Management Training (DSMT) (no USPSTF recommendation) Requires referral by treating physician for patient with diabetes or renal disease. 10 hours of initial DSMT session of no less than 30 minutes each in a continuous 12-month period.  2 hours of follow-up DSMT in subsequent years.  N/A N/A Glaucoma Screening (no USPSTF recommendation) Diabetes mellitus, family history, , age 48 or over,  American, age 72 or over Completed 11/18     Recommended annually Annually    Human Immunodeficiency Virus (HIV) Screening (annually for increased risk patients)  HIV-1 and HIV-2 by EIA, COY, rapid antibody test, or oral mucosa transudate Patient must be at increased risk for HIV infection per USPSTF guidelines or pregnant.  Tests covered annually for patients at increased risk.  Pregnant patients may receive up to 3 test during pregnancy. N/A N/A   Medical Nutrition Therapy (MNT) (for diabetes or renal disease not recommended schedule) Requires referral by treating physician for patient with diabetes or renal disease.  Can be provided in same year as diabetes self-management training (DSMT), and CMS recommends medical nutrition therapy take place after DSMT.  Up to 3 hours for initial year and 2 hours in subsequent years. N/A N/A             Seasonal Influenza Vaccination (annually)   Completed Fall 2019      Recommended annually Due Fall 2020    Pneumococcal Vaccination (once after 65)   Pneumococcal 23 - 4/2009     Prevnar 13 - 11/2016     Both recommended once over the age of 72 Completed        Completed   Hepatitis B Vaccinations (if medium/high risk) Medium/high risk factors:  End-stage renal disease,  Hemophiliacs who received Factor VIII or IX concentrates, Clients of institutions for the mentally retarded, Persons who live in the same house as a HepB virus carrier, Homosexual men, Illicit injectable drug abusers. N/A N/A   Screening Mammography (biennial age 54-69)?  Annually (age 36 or over) Completed 7/2019      Recommended annually  Due 7/2020    Screening Pap Tests and Pelvic Examination (up to age 79 and after 79 if unknown history or abnormal study last 10 years) Every 24 months except high risk Completed 5/2018      Recommended every 2 years Due 5/2020   Ultrasound Screening for Abdominal Aortic Aneurysm (AAA) (once) Patient must be referred through IPPE and not have had a screening for abdominal aortic aneurysm before under Medicare.  Limited to patients who meet one of the following criteria:  - Men who are 73-68 years old and have smoked more than 100 cigarettes in their lifetime.  -Anyone with a FH of AAA  -Anyone recommended for screening by USPSTF Completed CT abd 10/2012 - negative Completed

## 2023-09-27 NOTE — OR NURSING
1419: Patient arrived to phase II from PACU 1 via gurney. Report received from RN. Respirations are spontaneous and unlabored. Dressing is CDI. VSS on RA. LUE; radial pulse is 2+, cap refill less than 3 seconds, warm.    1425: Family at bedside.    1440: Patient education completed, family denies further questions.     1445: Patient wheeled to vehicle by CNA at this time. Dc'd to care of family post uneventful stay in PACU 2.

## 2023-09-27 NOTE — ANESTHESIA POSTPROCEDURE EVALUATION
Patient: Jeffrey Cerda    Procedure Summary     Date: 09/27/23 Room / Location: [place] [address] / SURGERY [facility]    Anesthesia Start: 1135 Anesthesia Stop: 1245    Procedure: LEFT SHOULDER ARTHROSCOPIC LYSIS OF ADHESIONS AND MANIPULATION UNDER ANESTHESIA (Left: Shoulder) Diagnosis: (ADHESIVE CAPSULITIS OF LEFT SHOULDER)    Surgeons: Bethel Parnell M.D. Responsible Provider: Álvaro Zabala M.D.    Anesthesia Type: general, peripheral nerve block ASA Status: 3          Final Anesthesia Type: general, peripheral nerve block  Last vitals  BP   Blood Pressure: 126/79    Temp   36 °C (96.8 °F)    Pulse   (!) 55   Resp   18    SpO2   95 %      Anesthesia Post Evaluation    Patient location during evaluation: PACU  Patient participation: complete - patient participated  Level of consciousness: awake and alert    Airway patency: patent  Anesthetic complications: no  Cardiovascular status: hemodynamically stable  Respiratory status: acceptable  Hydration status: euvolemic    PONV: none    patient able to participate, but full recovery from regional anesthesia has not occurred and is not expected within the stipulated timeframe for the completion of the evaluation      There were no known notable events for this encounter.     Nurse Pain Score: 0 (NPRS)

## 2023-09-27 NOTE — ANESTHESIA PROCEDURE NOTES
Peripheral Block    Date/Time: 9/27/2023 11:17 AM    Performed by: Álvaro Zabala M.D.  Authorized by: Álvaro Zabala M.D.    Patient Location:  Pre-op  Start Time:  9/27/2023 11:17 AM  End Time:  9/27/2023 11:22 AM  Reason for Block: at surgeon's request and post-op pain management ONLY    patient identified, IV checked, site marked, risks and benefits discussed, surgical consent, monitors and equipment checked, pre-op evaluation and timeout performed    Patient Position:  Supine  Prep: ChloraPrep    Monitoring:  Heart rate, continuous pulse ox and cardiac monitor  Block Region:  Upper Extremity  Upper Extremity - Block Type:  BRACHIAL PLEXUS block, Interscalene approach    Laterality:  Left  Procedures: ultrasound guided  Image captured, interpreted and electronically stored.  Local Infiltration:  Lidocaine  Strength:  2 %  Dose:  3 ml  Block Type:  Single-shot  Needle Length:  100mm  Needle Gauge:  21 G  Needle Localization:  Ultrasound guidance  Injection Assessment:  Negative aspiration for heme, no paresthesia on injection, incremental injection and local visualized surrounding nerve on ultrasound  Evidence of intravascular injection: No     US Guided Interscalene Brachial Plexus Block   US transducer placed on the neck in oblique plane approximately at the level of C6.  Anterior and Middle Scalene (MSM) muscles identified with nerve trunks identified between the muscles.  Needle inserted lateral to probe and advanced under direct visualization through the MSM into a perineural position.  After negative aspiration, LA injected with ease and visualized surrounding the nerve trunks.

## 2023-09-27 NOTE — OR SURGEON
Immediate Post OP Note    PreOp Diagnosis: Left shoulder frozen shoulder      PostOp Diagnosis: Same      Procedure(s):  LEFT SHOULDER ARTHROSCOPIC LYSIS OF ADHESIONS AND MANIPULATION UNDER ANESTHESIA - Wound Class: Clean    Surgeon(s):  Bethel Parnell M.D.    Anesthesiologist/Type of Anesthesia:  Anesthesiologist: Álvaro Zabala M.D./General    Surgical Staff:  Circulator: James Khan R.N.  Relief Circulator: Eliana Pulliam R.N.  Scrub Person: Yvonne Wright; [facility] A surgical assistant was required for this procedure for multiple reasons.  This is a complicated procedure that requires surgically trained assistant to assist in patient positioning, tissue handling and exposure, as well as, assistance with surgical wound closure.  There were multiple maneuvers being performed simultaneously that enemy able to accomplish without an assistant.  Performing this procedure alone would increase the complexity, the operating time, the anesthesia time and potentially increased perioperative complication rates.      Specimens removed if any:  * No specimens in log *    Estimated Blood Loss: Minimal    Findings: Exam in the anesthesia there was some stiffness full range of motion in forward flexion as well as abduction external rotation and external rotation at the side  Diagnostic arthroscopy no significant chondromalacia symptomatic degenerative fraying anterior superior labrum rotator interval was irritated and scarred in the inferior capsule was capacious extensive bursitis in the subacromial space no downsloping acromion no rotator cuff tear    Complications: None        9/27/2023 12:38 PM Bethel Parnell M.D.

## 2024-09-03 ENCOUNTER — APPOINTMENT (OUTPATIENT)
Dept: RADIOLOGY | Facility: MEDICAL CENTER | Age: 49
End: 2024-09-03
Attending: EMERGENCY MEDICINE
Payer: COMMERCIAL

## 2024-09-03 ENCOUNTER — HOSPITAL ENCOUNTER (EMERGENCY)
Facility: MEDICAL CENTER | Age: 49
End: 2024-09-03
Attending: EMERGENCY MEDICINE
Payer: COMMERCIAL

## 2024-09-03 VITALS
DIASTOLIC BLOOD PRESSURE: 81 MMHG | OXYGEN SATURATION: 95 % | WEIGHT: 315 LBS | HEART RATE: 53 BPM | HEIGHT: 78 IN | BODY MASS INDEX: 36.45 KG/M2 | SYSTOLIC BLOOD PRESSURE: 137 MMHG | RESPIRATION RATE: 16 BRPM | TEMPERATURE: 96.2 F
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
## 2024-09-03 DIAGNOSIS — M25.512 ACUTE PAIN OF LEFT SHOULDER: ICD-10-CM

## 2024-09-03 DIAGNOSIS — G62.9 NEUROPATHY: ICD-10-CM

## 2024-09-03 LAB
ALBUMIN SERPL BCP-MCNC: 3.9 G/DL (ref 3.2–4.9)
ALBUMIN/GLOB SERPL: 1.7 G/DL
ALP SERPL-CCNC: 90 U/L (ref 30–99)
ALT SERPL-CCNC: 22 U/L (ref 2–50)
ANION GAP SERPL CALC-SCNC: 11 MMOL/L (ref 7–16)
AST SERPL-CCNC: 17 U/L (ref 12–45)
BASOPHILS # BLD AUTO: 0.9 % (ref 0–1.8)
BASOPHILS # BLD: 0.05 K/UL (ref 0–0.12)
BILIRUB SERPL-MCNC: 0.6 MG/DL (ref 0.1–1.5)
BUN SERPL-MCNC: 11 MG/DL (ref 8–22)
CALCIUM ALBUM COR SERPL-MCNC: 8.8 MG/DL (ref 8.5–10.5)
CALCIUM SERPL-MCNC: 8.7 MG/DL (ref 8.5–10.5)
CHLORIDE SERPL-SCNC: 105 MMOL/L (ref 96–112)
CO2 SERPL-SCNC: 25 MMOL/L (ref 20–33)
CREAT SERPL-MCNC: 0.9 MG/DL (ref 0.5–1.4)
EOSINOPHIL # BLD AUTO: 0.29 K/UL (ref 0–0.51)
EOSINOPHIL NFR BLD: 5.3 % (ref 0–6.9)
ERYTHROCYTE [DISTWIDTH] IN BLOOD BY AUTOMATED COUNT: 45.7 FL (ref 35.9–50)
ETHANOL BLD-MCNC: <10.1 MG/DL
GFR SERPLBLD CREATININE-BSD FMLA CKD-EPI: 104 ML/MIN/1.73 M 2
GLOBULIN SER CALC-MCNC: 2.3 G/DL (ref 1.9–3.5)
GLUCOSE SERPL-MCNC: 99 MG/DL (ref 65–99)
HCT VFR BLD AUTO: 44.1 % (ref 42–52)
HGB BLD-MCNC: 15.6 G/DL (ref 14–18)
IMM GRANULOCYTES # BLD AUTO: 0.01 K/UL (ref 0–0.11)
IMM GRANULOCYTES NFR BLD AUTO: 0.2 % (ref 0–0.9)
LYMPHOCYTES # BLD AUTO: 2.03 K/UL (ref 1–4.8)
LYMPHOCYTES NFR BLD: 37.2 % (ref 22–41)
MCH RBC QN AUTO: 35.1 PG (ref 27–33)
MCHC RBC AUTO-ENTMCNC: 35.4 G/DL (ref 32.3–36.5)
MCV RBC AUTO: 99.1 FL (ref 81.4–97.8)
MONOCYTES # BLD AUTO: 0.42 K/UL (ref 0–0.85)
MONOCYTES NFR BLD AUTO: 7.7 % (ref 0–13.4)
NEUTROPHILS # BLD AUTO: 2.66 K/UL (ref 1.82–7.42)
NEUTROPHILS NFR BLD: 48.7 % (ref 44–72)
NRBC # BLD AUTO: 0 K/UL
NRBC BLD-RTO: 0 /100 WBC (ref 0–0.2)
PLATELET # BLD AUTO: 184 K/UL (ref 164–446)
PMV BLD AUTO: 10 FL (ref 9–12.9)
POTASSIUM SERPL-SCNC: 4.1 MMOL/L (ref 3.6–5.5)
PROT SERPL-MCNC: 6.2 G/DL (ref 6–8.2)
RBC # BLD AUTO: 4.45 M/UL (ref 4.7–6.1)
SODIUM SERPL-SCNC: 141 MMOL/L (ref 135–145)
WBC # BLD AUTO: 5.5 K/UL (ref 4.8–10.8)

## 2024-09-03 PROCEDURE — 700111 HCHG RX REV CODE 636 W/ 250 OVERRIDE (IP): Mod: JZ | Performed by: EMERGENCY MEDICINE

## 2024-09-03 PROCEDURE — 71045 X-RAY EXAM CHEST 1 VIEW: CPT

## 2024-09-03 PROCEDURE — 72146 MRI CHEST SPINE W/O DYE: CPT

## 2024-09-03 PROCEDURE — 73090 X-RAY EXAM OF FOREARM: CPT | Mod: LT

## 2024-09-03 PROCEDURE — 700102 HCHG RX REV CODE 250 W/ 637 OVERRIDE(OP): Performed by: EMERGENCY MEDICINE

## 2024-09-03 PROCEDURE — 96375 TX/PRO/DX INJ NEW DRUG ADDON: CPT

## 2024-09-03 PROCEDURE — 36415 COLL VENOUS BLD VENIPUNCTURE: CPT

## 2024-09-03 PROCEDURE — 96376 TX/PRO/DX INJ SAME DRUG ADON: CPT

## 2024-09-03 PROCEDURE — 80053 COMPREHEN METABOLIC PANEL: CPT

## 2024-09-03 PROCEDURE — 73060 X-RAY EXAM OF HUMERUS: CPT | Mod: LT

## 2024-09-03 PROCEDURE — 700111 HCHG RX REV CODE 636 W/ 250 OVERRIDE (IP): Performed by: EMERGENCY MEDICINE

## 2024-09-03 PROCEDURE — 70450 CT HEAD/BRAIN W/O DYE: CPT

## 2024-09-03 PROCEDURE — 73030 X-RAY EXAM OF SHOULDER: CPT | Mod: LT

## 2024-09-03 PROCEDURE — 96374 THER/PROPH/DIAG INJ IV PUSH: CPT

## 2024-09-03 PROCEDURE — 72128 CT CHEST SPINE W/O DYE: CPT

## 2024-09-03 PROCEDURE — 72125 CT NECK SPINE W/O DYE: CPT

## 2024-09-03 PROCEDURE — 85025 COMPLETE CBC W/AUTO DIFF WBC: CPT

## 2024-09-03 PROCEDURE — 99285 EMERGENCY DEPT VISIT HI MDM: CPT

## 2024-09-03 PROCEDURE — 700105 HCHG RX REV CODE 258: Performed by: EMERGENCY MEDICINE

## 2024-09-03 PROCEDURE — A9270 NON-COVERED ITEM OR SERVICE: HCPCS | Performed by: EMERGENCY MEDICINE

## 2024-09-03 PROCEDURE — 72131 CT LUMBAR SPINE W/O DYE: CPT

## 2024-09-03 PROCEDURE — 72141 MRI NECK SPINE W/O DYE: CPT

## 2024-09-03 PROCEDURE — 82077 ASSAY SPEC XCP UR&BREATH IA: CPT

## 2024-09-03 RX ORDER — SODIUM CHLORIDE 9 MG/ML
1000 INJECTION, SOLUTION INTRAVENOUS ONCE
Status: COMPLETED | OUTPATIENT
Start: 2024-09-03 | End: 2024-09-03

## 2024-09-03 RX ORDER — HYDROMORPHONE HYDROCHLORIDE 1 MG/ML
1 INJECTION, SOLUTION INTRAMUSCULAR; INTRAVENOUS; SUBCUTANEOUS ONCE
Status: COMPLETED | OUTPATIENT
Start: 2024-09-03 | End: 2024-09-03

## 2024-09-03 RX ORDER — MORPHINE SULFATE 4 MG/ML
4 INJECTION INTRAVENOUS ONCE
Status: COMPLETED | OUTPATIENT
Start: 2024-09-03 | End: 2024-09-03

## 2024-09-03 RX ORDER — OXYCODONE AND ACETAMINOPHEN 5; 325 MG/1; MG/1
1 TABLET ORAL EVERY 4 HOURS PRN
Qty: 15 TABLET | Refills: 0 | Status: SHIPPED | OUTPATIENT
Start: 2024-09-03 | End: 2024-09-08

## 2024-09-03 RX ORDER — ONDANSETRON 2 MG/ML
4 INJECTION INTRAMUSCULAR; INTRAVENOUS ONCE
Status: COMPLETED | OUTPATIENT
Start: 2024-09-03 | End: 2024-09-03

## 2024-09-03 RX ORDER — OXYCODONE AND ACETAMINOPHEN 10; 325 MG/1; MG/1
1 TABLET ORAL ONCE
Status: COMPLETED | OUTPATIENT
Start: 2024-09-03 | End: 2024-09-03

## 2024-09-03 RX ORDER — HYDROMORPHONE HYDROCHLORIDE 1 MG/ML
0.5 INJECTION, SOLUTION INTRAMUSCULAR; INTRAVENOUS; SUBCUTANEOUS ONCE
Status: COMPLETED | OUTPATIENT
Start: 2024-09-03 | End: 2024-09-03

## 2024-09-03 RX ORDER — LORAZEPAM 2 MG/ML
1 INJECTION INTRAMUSCULAR ONCE
Status: COMPLETED | OUTPATIENT
Start: 2024-09-03 | End: 2024-09-03

## 2024-09-03 RX ADMIN — LORAZEPAM 1 MG: 2 INJECTION INTRAMUSCULAR; INTRAVENOUS at 17:50

## 2024-09-03 RX ADMIN — HYDROMORPHONE HYDROCHLORIDE 1 MG: 1 INJECTION, SOLUTION INTRAMUSCULAR; INTRAVENOUS; SUBCUTANEOUS at 12:50

## 2024-09-03 RX ADMIN — SODIUM CHLORIDE 1000 ML: 9 INJECTION, SOLUTION INTRAVENOUS at 15:10

## 2024-09-03 RX ADMIN — MORPHINE SULFATE 4 MG: 4 INJECTION, SOLUTION INTRAMUSCULAR; INTRAVENOUS at 19:13

## 2024-09-03 RX ADMIN — HYDROMORPHONE HYDROCHLORIDE 0.5 MG: 1 INJECTION, SOLUTION INTRAMUSCULAR; INTRAVENOUS; SUBCUTANEOUS at 15:11

## 2024-09-03 RX ADMIN — MORPHINE SULFATE 4 MG: 4 INJECTION, SOLUTION INTRAMUSCULAR; INTRAVENOUS at 11:47

## 2024-09-03 RX ADMIN — ONDANSETRON 4 MG: 2 INJECTION INTRAMUSCULAR; INTRAVENOUS at 11:47

## 2024-09-03 RX ADMIN — OXYCODONE AND ACETAMINOPHEN 1 TABLET: 10; 325 TABLET ORAL at 20:00

## 2024-09-03 ASSESSMENT — FIBROSIS 4 INDEX: FIB4 SCORE: 1.54
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
## 2024-09-03 NOTE — ED NOTES
Assist RN: spoke w/ MRI. Scan delayed d/t procedure in MRI.                    MRI will notify main RN 15 minutes prior to scan  in  order for pt to be pre-medicated for MRI        RN  updated pt and apologized for increased wait time.

## 2024-09-04 NOTE — DISCHARGE INSTRUCTIONS
MRI of the spine was normal there is no signs of spinal injury, no signs of nerve impingement.  You are being given a sling to help support the arm to give this some time to rest and improved.  Please follow-up with your pain management specialist.

## 2024-09-04 NOTE — ED NOTES
Medicated patient per MAR. Sling applied to LUE. Patient provided discharge instructions. Patient verbalized understanding. Patient leaving ER in stable condition. Patient ambulatory with steady gait. Wristband and IV removed.
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
## (undated) DEVICE — WATER IRRIGATION STERILE 1000ML (12EA/CA)

## (undated) DEVICE — TUBING CASSETTE CROSSFLOW INTEGRATED (10EA/CA)

## (undated) DEVICE — SHAVER4.0 AGGRESSIVE + FORMLA (5EA/BX)

## (undated) DEVICE — HUMID-VENT HEAT AND MOISTURE EXCHANGE- (50/BX)

## (undated) DEVICE — TUBE CONNECTING SUCTION - CLEAR PLASTIC STERILE 72 IN (50EA/CA)

## (undated) DEVICE — SUTURE GENERAL

## (undated) DEVICE — DRAPE LARGE 3 QUARTER - (20/CA)

## (undated) DEVICE — SUCTION INSTRUMENT YANKAUER BULBOUS TIP W/O VENT (50EA/CA)

## (undated) DEVICE — TOWEL STOP TIMEOUT SAFETY FLAG (40EA/CA)

## (undated) DEVICE — GOWN WARMING STANDARD FLEX - (30/CA)

## (undated) DEVICE — CANISTER SUCTION RIGID RED 1500CC (40EA/CA)

## (undated) DEVICE — ELECTRODE DUAL RETURN W/ CORD - (50/PK)

## (undated) DEVICE — BAG SPONGE COUNT 10.25 X 32 - BLUE (250/CA)

## (undated) DEVICE — SODIUM CHL. IRRIGATION 0.9% 3000ML (4EA/CA 65CA/PF)

## (undated) DEVICE — SLEEVE VASO CALF MED - (10PR/CA)

## (undated) DEVICE — TUBING DAY USE W/CARTRIDGE (10EA/BX)

## (undated) DEVICE — SENSOR OXIMETER ADULT SPO2 RD SET (20EA/BX)

## (undated) DEVICE — SODIUM CHL IRRIGATION 0.9% 1000ML (12EA/CA)

## (undated) DEVICE — COVER LIGHT HANDLE FLEXIBLE - SOFT (2EA/PK 80PK/CA)

## (undated) DEVICE — GLOVE BIOGEL SZ 8 SURGICAL PF LTX - (50PR/BX 4BX/CA)

## (undated) DEVICE — CANNULA TWIST IN 6MM X 7CM (5EA/BX)